# Patient Record
Sex: FEMALE | Race: WHITE | NOT HISPANIC OR LATINO | Employment: UNEMPLOYED | URBAN - METROPOLITAN AREA
[De-identification: names, ages, dates, MRNs, and addresses within clinical notes are randomized per-mention and may not be internally consistent; named-entity substitution may affect disease eponyms.]

---

## 2021-05-10 ENCOUNTER — GYNECOLOGY VISIT (OUTPATIENT)
Dept: OBGYN | Facility: CLINIC | Age: 36
End: 2021-05-10
Payer: COMMERCIAL

## 2021-05-10 VITALS — DIASTOLIC BLOOD PRESSURE: 66 MMHG | WEIGHT: 149 LBS | SYSTOLIC BLOOD PRESSURE: 118 MMHG

## 2021-05-10 DIAGNOSIS — N93.8 DUB (DYSFUNCTIONAL UTERINE BLEEDING): ICD-10-CM

## 2021-05-10 DIAGNOSIS — Z32.01 POSITIVE PREGNANCY TEST: ICD-10-CM

## 2021-05-10 LAB — IN CLINIC OB SCAN: NORMAL

## 2021-05-10 PROCEDURE — 76830 TRANSVAGINAL US NON-OB: CPT | Performed by: OBSTETRICS & GYNECOLOGY

## 2021-05-10 PROCEDURE — 99203 OFFICE O/P NEW LOW 30 MIN: CPT | Mod: 25 | Performed by: OBSTETRICS & GYNECOLOGY

## 2021-05-10 NOTE — PROGRESS NOTES
CC: Confirmation of Pregnancy    HPI: Pt is a 34 yo  lmp 21 who presents for evaluation of amenorrhea.  She has had no bleeding since her last menses.  A urine pregnancy test was positive.  She denies vaginal spotting or pain.  She notes nausea and vomiting.      History reviewed. No pertinent past medical history.    Past Surgical History:   Procedure Laterality Date   • LUMPECTOMY           Current Outpatient Medications:   •  Prenatal MV-Min-Fe Fum-FA-DHA (PRENATAL 1 PO), Take  by mouth., Disp: , Rfl:     Patient has no known allergies.    Family History   Problem Relation Age of Onset   • No Known Problems Mother    • No Known Problems Father        Social History     Socioeconomic History   • Marital status:      Spouse name: Not on file   • Number of children: Not on file   • Years of education: Not on file   • Highest education level: Not on file   Occupational History   • Not on file   Tobacco Use   • Smoking status: Never Smoker   • Smokeless tobacco: Never Used   Substance and Sexual Activity   • Alcohol use: Not Currently   • Drug use: Never   • Sexual activity: Yes     Partners: Male   Other Topics Concern   • Not on file   Social History Narrative   • Not on file     Social Determinants of Health     Financial Resource Strain:    • Difficulty of Paying Living Expenses:    Food Insecurity:    • Worried About Running Out of Food in the Last Year:    • Ran Out of Food in the Last Year:    Transportation Needs:    • Lack of Transportation (Medical):    • Lack of Transportation (Non-Medical):    Physical Activity:    • Days of Exercise per Week:    • Minutes of Exercise per Session:    Stress:    • Feeling of Stress :    Social Connections:    • Frequency of Communication with Friends and Family:    • Frequency of Social Gatherings with Friends and Family:    • Attends Jainism Services:    • Active Member of Clubs or Organizations:    • Attends Club or Organization Meetings:    • Marital  Status:    Intimate Partner Violence:    • Fear of Current or Ex-Partner:    • Emotionally Abused:    • Physically Abused:    • Sexually Abused:        OB History    Para Term  AB Living   3 1 1 0 1 1   SAB TAB Ectopic Molar Multiple Live Births   0 1 0 0 0 1       ROS: negative for dizziness, SOB, chest pain, palpitations, dysuria, vaginal discharge.    /66   Wt 67.6 kg (149 lb)     GENERAL: Alert, in no apparent distress  PSYCHIATRIC: Appropriate affect, intact insight and judgement.  ABDOMEN: Soft, nontender, nondistended.  No palpable masses. No hepatosplenomegaly.   No rebound or guarding.  No inguinal lymphadenopathy.  BACK: No CVA tenderness  EXTREMITIES: No edema, no calf tenderness.    GENITOURINARY:  Normal external genitalia, no lesions.  Normal urethral meatus, no masses or tenderness.  Normal bladder without fullness or masses.  Vagina well estrogenized, no vaginal discharge or lesions.  Cervix normal length, nontender.  Uterus normal size, shape, and contour, nontender.  Adnexa nontender, no masses.  Normal anus and perineum.      TRANSVAGINAL ULTRASOUND - performed and interpreted by me    Single gestational sac present.  Yolk sac present.     Dorsey intrauterine pregnancy with CRL measuring 3.16 cm, c/w 10+0/7 weeks EGA, positive fetal cardiac activity noted. FHTs 174 bpm.  EDC 21.     No fluid in cul de sac.    Ovaries and cervix appear grossly normal. Cervical length = 3.87 cm.      ASSESSMENT/PLAN:  1. DUB visit - Dorsey IUP at 10+0/7 wks. SHERITA will be 2021 by ultrasound today.  Prenatal Vitamins.  F/U for NOB appointment 2 weeks.   2.  Desires NIPT testing -order placed.

## 2021-05-25 ENCOUNTER — HOSPITAL ENCOUNTER (OUTPATIENT)
Facility: MEDICAL CENTER | Age: 36
End: 2021-05-25
Attending: OBSTETRICS & GYNECOLOGY
Payer: COMMERCIAL

## 2021-05-25 ENCOUNTER — INITIAL PRENATAL (OUTPATIENT)
Dept: OBGYN | Facility: CLINIC | Age: 36
End: 2021-05-25
Payer: COMMERCIAL

## 2021-05-25 VITALS — SYSTOLIC BLOOD PRESSURE: 109 MMHG | DIASTOLIC BLOOD PRESSURE: 79 MMHG | WEIGHT: 151 LBS

## 2021-05-25 DIAGNOSIS — Z34.81 ENCOUNTER FOR SUPERVISION OF OTHER NORMAL PREGNANCY IN FIRST TRIMESTER: ICD-10-CM

## 2021-05-25 PROCEDURE — 87591 N.GONORRHOEAE DNA AMP PROB: CPT

## 2021-05-25 PROCEDURE — 87624 HPV HI-RISK TYP POOLED RSLT: CPT

## 2021-05-25 PROCEDURE — 59401 PR NEW OB VISIT: CPT | Performed by: OBSTETRICS & GYNECOLOGY

## 2021-05-25 PROCEDURE — 87491 CHLMYD TRACH DNA AMP PROBE: CPT

## 2021-05-25 PROCEDURE — 88175 CYTOPATH C/V AUTO FLUID REDO: CPT

## 2021-05-26 LAB
C TRACH DNA GENITAL QL NAA+PROBE: NEGATIVE
CYTOLOGY REG CYTOL: NORMAL
HPV HR 12 DNA CVX QL NAA+PROBE: NEGATIVE
HPV16 DNA SPEC QL NAA+PROBE: NEGATIVE
HPV18 DNA SPEC QL NAA+PROBE: NEGATIVE
N GONORRHOEA DNA GENITAL QL NAA+PROBE: NEGATIVE
SPECIMEN SOURCE: NORMAL
SPECIMEN SOURCE: NORMAL

## 2021-06-01 ENCOUNTER — HOSPITAL ENCOUNTER (OUTPATIENT)
Dept: LAB | Facility: MEDICAL CENTER | Age: 36
End: 2021-06-01
Attending: OBSTETRICS & GYNECOLOGY
Payer: COMMERCIAL

## 2021-06-01 DIAGNOSIS — Z34.81 ENCOUNTER FOR SUPERVISION OF OTHER NORMAL PREGNANCY IN FIRST TRIMESTER: ICD-10-CM

## 2021-06-01 LAB
ABO GROUP BLD: NORMAL
AMPHET UR QL SCN: NEGATIVE
BACTERIA #/AREA URNS HPF: ABNORMAL /HPF
BARBITURATES UR QL SCN: NEGATIVE
BENZODIAZ UR QL SCN: NEGATIVE
BLD GP AB SCN SERPL QL: NORMAL
BZE UR QL SCN: NEGATIVE
CANNABINOIDS UR QL SCN: NEGATIVE
EPI CELLS #/AREA URNS HPF: ABNORMAL /HPF
METHADONE UR QL SCN: NEGATIVE
MUCOUS THREADS #/AREA URNS HPF: ABNORMAL /HPF
OPIATES UR QL SCN: NEGATIVE
OXYCODONE UR QL SCN: NEGATIVE
PCP UR QL SCN: NEGATIVE
PROPOXYPH UR QL SCN: NEGATIVE
RBC # URNS HPF: ABNORMAL /HPF
RH BLD: NORMAL
UNIDENT CRYS URNS QL MICRO: ABNORMAL /HPF
WBC #/AREA URNS HPF: ABNORMAL /HPF

## 2021-06-01 PROCEDURE — 86901 BLOOD TYPING SEROLOGIC RH(D): CPT

## 2021-06-01 PROCEDURE — 86900 BLOOD TYPING SEROLOGIC ABO: CPT

## 2021-06-01 PROCEDURE — 87340 HEPATITIS B SURFACE AG IA: CPT

## 2021-06-01 PROCEDURE — 87086 URINE CULTURE/COLONY COUNT: CPT

## 2021-06-01 PROCEDURE — 85025 COMPLETE CBC W/AUTO DIFF WBC: CPT

## 2021-06-01 PROCEDURE — 86762 RUBELLA ANTIBODY: CPT

## 2021-06-01 PROCEDURE — 86850 RBC ANTIBODY SCREEN: CPT

## 2021-06-01 PROCEDURE — 36415 COLL VENOUS BLD VENIPUNCTURE: CPT

## 2021-06-01 PROCEDURE — 80307 DRUG TEST PRSMV CHEM ANLYZR: CPT

## 2021-06-01 PROCEDURE — 81001 URINALYSIS AUTO W/SCOPE: CPT

## 2021-06-01 PROCEDURE — 87389 HIV-1 AG W/HIV-1&-2 AB AG IA: CPT

## 2021-06-01 PROCEDURE — 86780 TREPONEMA PALLIDUM: CPT

## 2021-06-02 ENCOUNTER — HOSPITAL ENCOUNTER (OUTPATIENT)
Dept: LAB | Facility: MEDICAL CENTER | Age: 36
End: 2021-06-02
Attending: OBSTETRICS & GYNECOLOGY
Payer: COMMERCIAL

## 2021-06-02 LAB
APPEARANCE UR: ABNORMAL
BASOPHILS # BLD AUTO: 0.4 % (ref 0–1.8)
BASOPHILS # BLD: 0.03 K/UL (ref 0–0.12)
BILIRUB UR QL STRIP.AUTO: NEGATIVE
COLOR UR: YELLOW
EOSINOPHIL # BLD AUTO: 0.11 K/UL (ref 0–0.51)
EOSINOPHIL NFR BLD: 1.5 % (ref 0–6.9)
ERYTHROCYTE [DISTWIDTH] IN BLOOD BY AUTOMATED COUNT: 38.1 FL (ref 35.9–50)
GLUCOSE UR STRIP.AUTO-MCNC: NEGATIVE MG/DL
HBV SURFACE AG SER QL: ABNORMAL
HCT VFR BLD AUTO: 37.6 % (ref 37–47)
HGB BLD-MCNC: 12.7 G/DL (ref 12–16)
HIV 1+2 AB+HIV1 P24 AG SERPL QL IA: NORMAL
IMM GRANULOCYTES # BLD AUTO: 0.02 K/UL (ref 0–0.11)
IMM GRANULOCYTES NFR BLD AUTO: 0.3 % (ref 0–0.9)
KETONES UR STRIP.AUTO-MCNC: NEGATIVE MG/DL
LEUKOCYTE ESTERASE UR QL STRIP.AUTO: ABNORMAL
LYMPHOCYTES # BLD AUTO: 2.44 K/UL (ref 1–4.8)
LYMPHOCYTES NFR BLD: 33.5 % (ref 22–41)
MCH RBC QN AUTO: 30 PG (ref 27–33)
MCHC RBC AUTO-ENTMCNC: 33.8 G/DL (ref 33.6–35)
MCV RBC AUTO: 88.9 FL (ref 81.4–97.8)
MICRO URNS: ABNORMAL
MONOCYTES # BLD AUTO: 0.54 K/UL (ref 0–0.85)
MONOCYTES NFR BLD AUTO: 7.4 % (ref 0–13.4)
NEUTROPHILS # BLD AUTO: 4.15 K/UL (ref 2–7.15)
NEUTROPHILS NFR BLD: 56.9 % (ref 44–72)
NITRITE UR QL STRIP.AUTO: NEGATIVE
NRBC # BLD AUTO: 0 K/UL
NRBC BLD-RTO: 0 /100 WBC
PH UR STRIP.AUTO: 5.5 [PH] (ref 5–8)
PLATELET # BLD AUTO: 327 K/UL (ref 164–446)
PMV BLD AUTO: 9.4 FL (ref 9–12.9)
PROT UR QL STRIP: NEGATIVE MG/DL
RBC # BLD AUTO: 4.23 M/UL (ref 4.2–5.4)
RBC UR QL AUTO: NEGATIVE
RUBV AB SER QL: 176 IU/ML
SP GR UR STRIP.AUTO: >=1.03
TREPONEMA PALLIDUM IGG+IGM AB [PRESENCE] IN SERUM OR PLASMA BY IMMUNOASSAY: ABNORMAL
WBC # BLD AUTO: 7.3 K/UL (ref 4.8–10.8)

## 2021-06-02 PROCEDURE — 36415 COLL VENOUS BLD VENIPUNCTURE: CPT

## 2021-06-02 PROCEDURE — 99001 SPECIMEN HANDLING PT-LAB: CPT

## 2021-06-03 ENCOUNTER — TELEPHONE (OUTPATIENT)
Dept: OBGYN | Facility: CLINIC | Age: 36
End: 2021-06-03

## 2021-06-04 ENCOUNTER — TELEPHONE (OUTPATIENT)
Dept: OBGYN | Facility: CLINIC | Age: 36
End: 2021-06-04

## 2021-06-04 LAB
BACTERIA UR CULT: NORMAL
SIGNIFICANT IND 70042: NORMAL
SITE SITE: NORMAL
SOURCE SOURCE: NORMAL

## 2021-06-04 NOTE — TELEPHONE ENCOUNTER
Patient called wanting to know what her labs result meant. Let patient know results have not been resulted by provider. Patient wanted to know if she has a UTI. Consulted with Dr. Fontenot no UTI was seen. Let patient know, states no concerns or further questions.

## 2021-06-21 ENCOUNTER — TELEPHONE (OUTPATIENT)
Dept: OBGYN | Facility: CLINIC | Age: 36
End: 2021-06-21

## 2021-06-21 ENCOUNTER — HOSPITAL ENCOUNTER (OUTPATIENT)
Dept: LAB | Facility: MEDICAL CENTER | Age: 36
End: 2021-06-21
Attending: OBSTETRICS & GYNECOLOGY
Payer: COMMERCIAL

## 2021-06-21 ENCOUNTER — ROUTINE PRENATAL (OUTPATIENT)
Dept: OBGYN | Facility: CLINIC | Age: 36
End: 2021-06-21
Payer: COMMERCIAL

## 2021-06-21 VITALS — SYSTOLIC BLOOD PRESSURE: 101 MMHG | WEIGHT: 153.9 LBS | DIASTOLIC BLOOD PRESSURE: 57 MMHG

## 2021-06-21 DIAGNOSIS — Z34.81 ENCOUNTER FOR SUPERVISION OF OTHER NORMAL PREGNANCY IN FIRST TRIMESTER: ICD-10-CM

## 2021-06-21 DIAGNOSIS — Z34.80 SUPERVISION OF OTHER NORMAL PREGNANCY, ANTEPARTUM: ICD-10-CM

## 2021-06-21 PROCEDURE — 90040 PR PRENATAL FOLLOW UP: CPT | Performed by: OBSTETRICS & GYNECOLOGY

## 2021-06-21 PROCEDURE — 36415 COLL VENOUS BLD VENIPUNCTURE: CPT

## 2021-06-21 PROCEDURE — 82105 ALPHA-FETOPROTEIN SERUM: CPT

## 2021-06-21 NOTE — TELEPHONE ENCOUNTER
06/21/21 @ 0955 pt called wondering what her NIPT Results were and if we have received them yet. I explained to pt that I see that that sample has been received but are still processing. Pt understands and agrees.

## 2021-06-25 LAB
# FETUSES US: NORMAL
AFP MOM SERPL: 0.74
AFP SERPL-MCNC: 25 NG/ML
AGE - REPORTED: 34.2 YR
CURRENT SMOKER: NO
FAMILY MEMBER DISEASES HX: NO
GA METHOD: NORMAL
GA: NORMAL WK
IDDM PATIENT QL: NO
INTEGRATED SCN PATIENT-IMP: NORMAL
SPECIMEN DRAWN SERPL: NORMAL

## 2021-07-08 ENCOUNTER — HOSPITAL ENCOUNTER (OUTPATIENT)
Dept: RADIOLOGY | Facility: MEDICAL CENTER | Age: 36
End: 2021-07-08
Attending: OBSTETRICS & GYNECOLOGY
Payer: COMMERCIAL

## 2021-07-16 ENCOUNTER — TELEPHONE (OUTPATIENT)
Dept: OBGYN | Facility: CLINIC | Age: 36
End: 2021-07-16

## 2021-07-16 DIAGNOSIS — O28.5 CHROMOSOMAL ANALYSIS ABNORMAL, FETAL, AFFECTING MOTHER, ANTEPARTUM: ICD-10-CM

## 2021-07-16 NOTE — TELEPHONE ENCOUNTER
07/16/21 9:49 AM    Pt called in regards to her NIPT results she has not received. Pt was drawn June 2nd and still had not heard. Printed off results from [facility], consulted with Dr. Alcala, referral to [facility] was ordered, Agustina Midwife offered to call pt with results.

## 2021-07-16 NOTE — PROGRESS NOTES
Name and  verified prior to speaking with pt.  Let her know gender and that testing results for Monosomy X were abnormal and referral to Dr Aggarwal placed.

## 2021-07-16 NOTE — TELEPHONE ENCOUNTER
07/16/21 11:19 AM    Pt called regarding the results of her NIPT after Agustina Lema call to inform her. Pt had questions regarding her anatomy is not scheduled until after her 20 weeks and she knows she may want options if the baby has chromosomal abnormalities. I let her know that Dr. Aggarwal will offer her more invasive testing to determine if anything is wrong and will do an ultrasound at that time of her appt.  She proceeded to let me know that her insurance changed. I let the pt know that I would transfer her to the  to update that information before our referrals dept goes to auth it. I asked pt if there were any more questions before transferring, pt did not and I transferred to [place] at the .

## 2021-07-16 NOTE — TELEPHONE ENCOUNTER
Patient called inquiring about her recent results and referral to Dr. Aggarwal. Advised pt that she can express her concerns and see what Dr. Aggarwal says about her results.

## 2021-07-20 ENCOUNTER — TELEPHONE (OUTPATIENT)
Dept: OBGYN | Facility: CLINIC | Age: 36
End: 2021-07-20

## 2021-07-20 NOTE — TELEPHONE ENCOUNTER
Pt called to see about getting a referral to [facility] I advised I will send a message to Dr. Murphy. Please allow a little time to receive a response.     Felice

## 2021-07-21 ENCOUNTER — HOSPITAL ENCOUNTER (OUTPATIENT)
Dept: LAB | Facility: MEDICAL CENTER | Age: 36
End: 2021-07-21
Attending: OBSTETRICS & GYNECOLOGY
Payer: COMMERCIAL

## 2021-07-21 ENCOUNTER — HOSPITAL ENCOUNTER (OUTPATIENT)
Facility: MEDICAL CENTER | Age: 36
End: 2021-07-21
Attending: OBSTETRICS & GYNECOLOGY
Payer: COMMERCIAL

## 2021-07-21 LAB — AMBIGUOUS DTTM AMBI4: NORMAL

## 2021-07-21 PROCEDURE — 81265 STR MARKERS SPECIMEN ANAL: CPT

## 2021-07-21 PROCEDURE — 81229 CYTOG ALYS CHRML ABNR SNPCGH: CPT

## 2021-07-21 PROCEDURE — 369999 HCHG MISC LAB CHARGE

## 2021-07-21 PROCEDURE — 82106 ALPHA-FETOPROTEIN AMNIOTIC: CPT

## 2021-07-21 PROCEDURE — 88261 CHROMOSOME ANALYSIS 5: CPT

## 2021-07-21 PROCEDURE — 88230 TISSUE CULTURE LYMPHOCYTE: CPT | Mod: 91

## 2021-07-21 PROCEDURE — 88235 TISSUE CULTURE PLACENTA: CPT

## 2021-07-21 PROCEDURE — 36415 COLL VENOUS BLD VENIPUNCTURE: CPT

## 2021-07-22 ENCOUNTER — ROUTINE PRENATAL (OUTPATIENT)
Dept: OBGYN | Facility: CLINIC | Age: 36
End: 2021-07-22
Payer: COMMERCIAL

## 2021-07-22 VITALS — SYSTOLIC BLOOD PRESSURE: 125 MMHG | WEIGHT: 157 LBS | DIASTOLIC BLOOD PRESSURE: 79 MMHG

## 2021-07-22 DIAGNOSIS — O09.92 SUPERVISION OF HIGH RISK PREGNANCY IN SECOND TRIMESTER: ICD-10-CM

## 2021-07-22 PROCEDURE — 90040 PR PRENATAL FOLLOW UP: CPT | Performed by: OBSTETRICS & GYNECOLOGY

## 2021-07-22 NOTE — PROGRESS NOTES
"Patient states today she is upset regarding her NIPT results, and delay of notification.  NIPT results showed \"no call result on X chromosome\".  She was seen by Dr. Aggarwal from perinatology on 7/21/2021.  He preferred a detailed ultrasound which was normal except for a small foramen ovale aneurysm.  She underwent amniocentesis, which was sent for AFP, karyotype, and microarray.  Dr. Aggarwal told her the results will be back in 3 to 4 weeks.  She is very anxious with regards to results, and is considering termination if the fetus is abnormal.  She is concerned that she will not get all the results back before 24 weeks, and will not be able to make an informed decision regarding termination.    The patient requests a referral to [facility] perinatology OB/GYN department, because she has heard that perhaps they can get results back within a 2-week interval.  I have placed an urgent referral per her request.    The patient will plan to follow-up in 4 weeks.  I have urged her to call with any concerns, or we we will walk her in earlier if necessary.   "

## 2021-07-27 ENCOUNTER — TELEPHONE (OUTPATIENT)
Dept: OBGYN | Facility: CLINIC | Age: 36
End: 2021-07-27

## 2021-07-27 NOTE — TELEPHONE ENCOUNTER
"Lisa from [facility] calling to get NIPT results from pt. Lisa called very upset stating that she had called Dr. Burroughs office and had requested the NIPT results but was told that they cannot provide that information due to it will be HIPAA violation since [facility] was the one who order the testing. I told her that we have the same protocol here and that if she needs to get the NIPT results a Release Records consent will need to be signed by the pt. In order for us to disclose that information. Lisa sounded upset and said \"No that's ok\" and ended the call.   "

## 2021-08-02 LAB
TEST NAME 95000: NORMAL
UNCODED TEST RESULT 95040: NORMAL

## 2021-08-03 ENCOUNTER — APPOINTMENT (OUTPATIENT)
Dept: RADIOLOGY | Facility: MEDICAL CENTER | Age: 36
End: 2021-08-03
Attending: OBSTETRICS & GYNECOLOGY
Payer: COMMERCIAL

## 2021-08-03 LAB — TEST NAME 95000: NORMAL

## 2021-08-04 ENCOUNTER — PATIENT MESSAGE (OUTPATIENT)
Dept: OBGYN | Facility: CLINIC | Age: 36
End: 2021-08-04

## 2021-08-04 NOTE — TELEPHONE ENCOUNTER
I spoke to the Pt and let her know that her results are still pending. Pt understood and had no other concerns. She would like us to check tomorrow on her results due to Heike NAVARRETE out of the office. I told her I would check daily

## 2021-08-07 LAB — UNCODED TEST RESULT 95040: NORMAL

## 2021-08-19 ENCOUNTER — ROUTINE PRENATAL (OUTPATIENT)
Dept: OBGYN | Facility: CLINIC | Age: 36
End: 2021-08-19
Payer: COMMERCIAL

## 2021-08-19 VITALS — DIASTOLIC BLOOD PRESSURE: 65 MMHG | WEIGHT: 165 LBS | SYSTOLIC BLOOD PRESSURE: 122 MMHG

## 2021-08-19 DIAGNOSIS — Z34.82 ENCOUNTER FOR SUPERVISION OF OTHER NORMAL PREGNANCY IN SECOND TRIMESTER: ICD-10-CM

## 2021-08-19 PROCEDURE — 90040 PR PRENATAL FOLLOW UP: CPT | Performed by: OBSTETRICS & GYNECOLOGY

## 2021-09-09 ENCOUNTER — HOSPITAL ENCOUNTER (OUTPATIENT)
Dept: LAB | Facility: MEDICAL CENTER | Age: 36
End: 2021-09-09
Attending: OBSTETRICS & GYNECOLOGY
Payer: COMMERCIAL

## 2021-09-09 DIAGNOSIS — Z34.82 ENCOUNTER FOR SUPERVISION OF OTHER NORMAL PREGNANCY IN SECOND TRIMESTER: ICD-10-CM

## 2021-09-09 LAB
BASOPHILS # BLD AUTO: 0.5 % (ref 0–1.8)
BASOPHILS # BLD: 0.04 K/UL (ref 0–0.12)
EOSINOPHIL # BLD AUTO: 0.08 K/UL (ref 0–0.51)
EOSINOPHIL NFR BLD: 1 % (ref 0–6.9)
ERYTHROCYTE [DISTWIDTH] IN BLOOD BY AUTOMATED COUNT: 41.1 FL (ref 35.9–50)
GLUCOSE 1H P 50 G GLC PO SERPL-MCNC: 85 MG/DL (ref 70–139)
HCT VFR BLD AUTO: 35 % (ref 37–47)
HGB BLD-MCNC: 11.4 G/DL (ref 12–16)
IMM GRANULOCYTES # BLD AUTO: 0.09 K/UL (ref 0–0.11)
IMM GRANULOCYTES NFR BLD AUTO: 1.2 % (ref 0–0.9)
LYMPHOCYTES # BLD AUTO: 1.85 K/UL (ref 1–4.8)
LYMPHOCYTES NFR BLD: 24.2 % (ref 22–41)
MCH RBC QN AUTO: 29.6 PG (ref 27–33)
MCHC RBC AUTO-ENTMCNC: 32.6 G/DL (ref 33.6–35)
MCV RBC AUTO: 90.9 FL (ref 81.4–97.8)
MONOCYTES # BLD AUTO: 0.53 K/UL (ref 0–0.85)
MONOCYTES NFR BLD AUTO: 6.9 % (ref 0–13.4)
NEUTROPHILS # BLD AUTO: 5.07 K/UL (ref 2–7.15)
NEUTROPHILS NFR BLD: 66.2 % (ref 44–72)
NRBC # BLD AUTO: 0 K/UL
NRBC BLD-RTO: 0 /100 WBC
PLATELET # BLD AUTO: 290 K/UL (ref 164–446)
PMV BLD AUTO: 10.6 FL (ref 9–12.9)
RBC # BLD AUTO: 3.85 M/UL (ref 4.2–5.4)
TREPONEMA PALLIDUM IGG+IGM AB [PRESENCE] IN SERUM OR PLASMA BY IMMUNOASSAY: NORMAL
WBC # BLD AUTO: 7.7 K/UL (ref 4.8–10.8)

## 2021-09-09 PROCEDURE — 86780 TREPONEMA PALLIDUM: CPT

## 2021-09-09 PROCEDURE — 36415 COLL VENOUS BLD VENIPUNCTURE: CPT

## 2021-09-09 PROCEDURE — 82950 GLUCOSE TEST: CPT

## 2021-09-09 PROCEDURE — 85025 COMPLETE CBC W/AUTO DIFF WBC: CPT

## 2021-09-14 ENCOUNTER — OFFICE VISIT (OUTPATIENT)
Dept: MEDICAL GROUP | Facility: PHYSICIAN GROUP | Age: 36
End: 2021-09-14
Payer: COMMERCIAL

## 2021-09-14 VITALS
DIASTOLIC BLOOD PRESSURE: 68 MMHG | WEIGHT: 167 LBS | TEMPERATURE: 97.8 F | SYSTOLIC BLOOD PRESSURE: 122 MMHG | OXYGEN SATURATION: 98 % | HEART RATE: 88 BPM | BODY MASS INDEX: 26.84 KG/M2 | HEIGHT: 66 IN | RESPIRATION RATE: 20 BRPM

## 2021-09-14 DIAGNOSIS — D22.9 NEVUS: ICD-10-CM

## 2021-09-14 PROBLEM — O28.5: Status: RESOLVED | Noted: 2021-07-16 | Resolved: 2021-09-14

## 2021-09-14 PROCEDURE — 99213 OFFICE O/P EST LOW 20 MIN: CPT | Performed by: PHYSICIAN ASSISTANT

## 2021-09-14 ASSESSMENT — PATIENT HEALTH QUESTIONNAIRE - PHQ9: CLINICAL INTERPRETATION OF PHQ2 SCORE: 0

## 2021-09-14 NOTE — ASSESSMENT & PLAN NOTE
Patient states that she has a nevus that has been there for her entire life located under bra line on left.  that she would like evaluated. She is currently 28 weeks pregnant and states the hormones of pregnancy are changing the appearance of the mole.

## 2021-09-14 NOTE — PROGRESS NOTES
"Subjective:     CC: establish care, nevus    HPI:   Zuly presents today with     Nevus  Patient states that she has a nevus that has been there for her entire life located under bra line on left.  that she would like evaluated. She is currently 28 weeks pregnant and states the hormones of pregnancy are changing the appearance of the mole.       No past medical history on file.    Social History     Tobacco Use   • Smoking status: Never Smoker   • Smokeless tobacco: Never Used   Vaping Use   • Vaping Use: Never used   Substance Use Topics   • Alcohol use: Not Currently   • Drug use: Never       Current Outpatient Medications Ordered in Epic   Medication Sig Dispense Refill   • Prenatal Vit-Fe Fumarate-FA (PRENATAL ONE DAILY PO) Take  by mouth.       No current Epic-ordered facility-administered medications on file.       Allergies:  Patient has no known allergies.    Health Maintenance: Completed    ROS:  Gen: no fevers/chills  Eyes: no changes in vision  ENT: no sore throat  Pulm: no sob, no cough  CV: no chest pain  GI: no nausea/vomiting  : no dysuria  MSk: no myalgias  Skin: positive for nevus  Neuro: no headaches  Psych: no depression, no anxiety    Objective:     Exam:  /68 (BP Location: Left arm, Patient Position: Sitting, BP Cuff Size: Adult)   Pulse 88   Temp 36.6 °C (97.8 °F) (Temporal)   Resp 20   Ht 1.676 m (5' 6\")   Wt 75.8 kg (167 lb)   SpO2 98%   Breastfeeding No   BMI 26.95 kg/m²  Body mass index is 26.95 kg/m².    Gen: Alert and oriented, No apparent distress.  Skin: Warm, dry, good turgor, no rashes in visible areas. 1 cm raised light brown nevus under left breast  HEENT: Normocephalic. Eyes conjunctiva clear lids without ptosis, pupils equal and reactive to light accommodation, ears normal shape and contour  Neck: Trachea midline, no masses, no thyromegaly  Lungs: Normal effort, CTA bilaterally, no wheezes, rhonchi, or rales  CV: Regular rate and rhythm. No murmurs, rubs, or " gallops.  MSK: Normal gait, moves all extremities.  Neuro: Grossly non-focal.  Ext: No clubbing, cyanosis, edema.  Psych: Alert and oriented x3, normal affect and mood.     Assessment & Plan:     35 y.o. female with the following -     1. Nevus  Patient has a nevus under left bra line that has been present her whole life. Her  is concerned because he thinks it has changed in appearance since she has been pregnant. Suspect the change in appearance is coming from irritation from her bra rubbing the area, however I have put in a referral to dermatology for possible removal as the patient is interested in this.   - REFERRAL TO DERMATOLOGY    I spent a total of 21 minutes with record review (including external notes and labs), exam, communication with the patient, communication with other providers, and documentation of this encounter.     Return if symptoms worsen or fail to improve, for annual wellness or sooner if needed.    Please note that this dictation was created using voice recognition software. I have made every reasonable attempt to correct obvious errors, but I expect that there are errors of grammar and possibly content that I did not discover before finalizing the note.    Electronically signed by Claudine Whatley PA-C on September 14, 2021

## 2021-09-15 ENCOUNTER — ROUTINE PRENATAL (OUTPATIENT)
Dept: OBGYN | Facility: CLINIC | Age: 36
End: 2021-09-15
Payer: COMMERCIAL

## 2021-09-15 VITALS — BODY MASS INDEX: 26.47 KG/M2 | SYSTOLIC BLOOD PRESSURE: 99 MMHG | DIASTOLIC BLOOD PRESSURE: 68 MMHG | WEIGHT: 164 LBS

## 2021-09-15 DIAGNOSIS — O09.93 HIGH-RISK PREGNANCY IN THIRD TRIMESTER: ICD-10-CM

## 2021-09-15 PROCEDURE — 90040 PR PRENATAL FOLLOW UP: CPT | Performed by: OBSTETRICS & GYNECOLOGY

## 2021-09-15 PROCEDURE — 90715 TDAP VACCINE 7 YRS/> IM: CPT | Performed by: OBSTETRICS & GYNECOLOGY

## 2021-09-15 PROCEDURE — 90471 IMMUNIZATION ADMIN: CPT | Performed by: OBSTETRICS & GYNECOLOGY

## 2021-09-15 NOTE — PROGRESS NOTES
OB Followup;    28w2d    Patient Active Problem List    Diagnosis Date Noted   • Nevus 2021       Vitals:    09/15/21 0824   BP: (!) 99/68   Weight: 74.4 kg (164 lb)       Patient presents for followup of OB care. Currently doing well . Good fetal movement no leakage of fluid no contractions or vaginal bleeding    Genetic amniocentesis (Dr. Aggarwal) normal per patient history    Size equals dates, normal fetal heart rate      Labs; CBC GTT and RPR normal    Tdap today    Discussed Covid vaccination-patient will receive Covid within the next week  Her  will also get vaccination    Labor precautions given  Discussed proper weight gain during pregnancy.    Signs and symptoms of labor/ labor discussed   Discussed our group's policy on prenatal checkups and delivery  Discussed Covid precautions  Discussed Covid vaccination recommendations from CDC/ACOG  Discussed proper exercise during pregnancy  Discussed proper oral fluid hydration  Currently taking prenatal vitamins as instructed  Reviewed fetal kick counts and appropriate fetal movement during pregnancy  Reviewed postpartum birth control methods  All questions answered in detail    Followup in  2 weeks

## 2021-09-15 NOTE — LETTER
"Count Your Baby's Movements  Another step to a healthy delivery    A Epic Dress Re Test             Dept: [phone]    How Many Weeks Pregnant? 28w2d    Date to Begin Countin/15/2021              How to use this chart    One way for your physician to keep track of your baby's health is by knowing how often the baby moves (or \"kicks\") in your womb.  You can help your physician to do this by using this chart every day.    Every day, you should see how many hours it takes for your baby to move 10 times.  Start in the morning, as soon as you get up.    · First, write down the time your baby moves until you get to 10.  · Check off one box every time your baby moves until you get to 10.  · Write down the time you finished counting in the last column.  · Total how long it took to count up all 10 movements.  · Finally, fill in the box that shows how long this took.  After counting 10 movements, you no longer have to count any more that day.  The next morning, just start counting again as soon as you get up.    What should you call a \"movement\"?  It is hard to say, because it will feel different from one mother to another and from one pregnancy to the next.  The important thing is that you count the movements the same way throughout your pregnancy.  If you have more questions, you should ask your physician.    Count carefully every day!  SAMPLE:  Week 28    How many hours did it take to feel 10 movements?       Start  Time     1     2     3     4     5     6     7     8     9     10   Finish Time   Mon 8:20 ·  ·  ·  ·  ·  ·  ·  ·  ·  ·  11:40                  Sat               Sun                 IMPORTANT: You should contact your physician if it takes more than two hours for you to feel 10 movements.  Each morning, write down the time and start to count the movements of your baby.  Keep track by checking off one box every time you feel one movement.  When you " "have felt 10 \"kicks\", write down the time you finished counting in the last column.  Then fill in the   box (over the check nancy) for the number of hours it took.  Be sure to read the complete instructions on the previous page.            "

## 2021-09-30 ENCOUNTER — ROUTINE PRENATAL (OUTPATIENT)
Dept: OBGYN | Facility: CLINIC | Age: 36
End: 2021-09-30
Payer: COMMERCIAL

## 2021-09-30 VITALS — SYSTOLIC BLOOD PRESSURE: 105 MMHG | WEIGHT: 167 LBS | BODY MASS INDEX: 26.95 KG/M2 | DIASTOLIC BLOOD PRESSURE: 64 MMHG

## 2021-09-30 DIAGNOSIS — Z34.83 ENCOUNTER FOR SUPERVISION OF OTHER NORMAL PREGNANCY IN THIRD TRIMESTER: ICD-10-CM

## 2021-09-30 PROCEDURE — 90040 PR PRENATAL FOLLOW UP: CPT | Performed by: OBSTETRICS & GYNECOLOGY

## 2021-10-15 ENCOUNTER — ROUTINE PRENATAL (OUTPATIENT)
Dept: OBGYN | Facility: CLINIC | Age: 36
End: 2021-10-15
Payer: COMMERCIAL

## 2021-10-15 VITALS — WEIGHT: 172 LBS | SYSTOLIC BLOOD PRESSURE: 113 MMHG | BODY MASS INDEX: 27.76 KG/M2 | DIASTOLIC BLOOD PRESSURE: 66 MMHG

## 2021-10-15 DIAGNOSIS — Z34.83 PRENATAL CARE, SUBSEQUENT PREGNANCY IN THIRD TRIMESTER: Primary | ICD-10-CM

## 2021-10-15 PROCEDURE — 90040 PR PRENATAL FOLLOW UP: CPT | Performed by: OBSTETRICS & GYNECOLOGY

## 2021-10-28 ENCOUNTER — ROUTINE PRENATAL (OUTPATIENT)
Dept: OBGYN | Facility: CLINIC | Age: 36
End: 2021-10-28
Payer: COMMERCIAL

## 2021-10-28 VITALS — DIASTOLIC BLOOD PRESSURE: 82 MMHG | BODY MASS INDEX: 28.08 KG/M2 | WEIGHT: 174 LBS | SYSTOLIC BLOOD PRESSURE: 114 MMHG

## 2021-10-28 DIAGNOSIS — Z34.83 ENCOUNTER FOR SUPERVISION OF OTHER NORMAL PREGNANCY IN THIRD TRIMESTER: ICD-10-CM

## 2021-10-28 PROCEDURE — 90040 PR PRENATAL FOLLOW UP: CPT | Performed by: OBSTETRICS & GYNECOLOGY

## 2021-11-09 ENCOUNTER — APPOINTMENT (OUTPATIENT)
Dept: OBGYN | Facility: CLINIC | Age: 36
End: 2021-11-09
Payer: COMMERCIAL

## 2021-11-10 ENCOUNTER — ROUTINE PRENATAL (OUTPATIENT)
Dept: OBGYN | Facility: CLINIC | Age: 36
End: 2021-11-10
Payer: COMMERCIAL

## 2021-11-10 ENCOUNTER — HOSPITAL ENCOUNTER (OUTPATIENT)
Facility: MEDICAL CENTER | Age: 36
End: 2021-11-10
Attending: OBSTETRICS & GYNECOLOGY
Payer: COMMERCIAL

## 2021-11-10 VITALS — BODY MASS INDEX: 28.08 KG/M2 | WEIGHT: 174 LBS | DIASTOLIC BLOOD PRESSURE: 68 MMHG | SYSTOLIC BLOOD PRESSURE: 108 MMHG

## 2021-11-10 DIAGNOSIS — Z34.83 ENCOUNTER FOR SUPERVISION OF OTHER NORMAL PREGNANCY IN THIRD TRIMESTER: ICD-10-CM

## 2021-11-10 PROCEDURE — 90040 PR PRENATAL FOLLOW UP: CPT | Performed by: OBSTETRICS & GYNECOLOGY

## 2021-11-10 PROCEDURE — 87150 DNA/RNA AMPLIFIED PROBE: CPT

## 2021-11-10 PROCEDURE — 87081 CULTURE SCREEN ONLY: CPT

## 2021-11-10 RX ORDER — BREAST PUMP
EACH MISCELLANEOUS
Qty: 1 EACH | Refills: 0 | Status: SHIPPED | OUTPATIENT
Start: 2021-11-10 | End: 2021-11-25

## 2021-11-12 LAB — GP B STREP DNA SPEC QL NAA+PROBE: POSITIVE

## 2021-11-13 ENCOUNTER — HOSPITAL ENCOUNTER (OUTPATIENT)
Dept: RADIOLOGY | Facility: MEDICAL CENTER | Age: 36
End: 2021-11-13
Attending: OBSTETRICS & GYNECOLOGY
Payer: COMMERCIAL

## 2021-11-13 PROCEDURE — 76816 OB US FOLLOW-UP PER FETUS: CPT

## 2021-11-15 PROBLEM — O99.820 GROUP B STREPTOCOCCAL CARRIAGE COMPLICATING PREGNANCY: Status: ACTIVE | Noted: 2021-11-15

## 2021-11-19 ENCOUNTER — ROUTINE PRENATAL (OUTPATIENT)
Dept: OBGYN | Facility: CLINIC | Age: 36
End: 2021-11-19
Payer: COMMERCIAL

## 2021-11-19 VITALS — BODY MASS INDEX: 28.25 KG/M2 | WEIGHT: 175 LBS | DIASTOLIC BLOOD PRESSURE: 73 MMHG | SYSTOLIC BLOOD PRESSURE: 122 MMHG

## 2021-11-19 DIAGNOSIS — Z34.83 ENCOUNTER FOR SUPERVISION OF OTHER NORMAL PREGNANCY IN THIRD TRIMESTER: ICD-10-CM

## 2021-11-19 PROCEDURE — 90040 PR PRENATAL FOLLOW UP: CPT | Performed by: OBSTETRICS & GYNECOLOGY

## 2021-11-24 ENCOUNTER — ROUTINE PRENATAL (OUTPATIENT)
Dept: OBGYN | Facility: CLINIC | Age: 36
End: 2021-11-24
Payer: COMMERCIAL

## 2021-11-24 VITALS — DIASTOLIC BLOOD PRESSURE: 69 MMHG | SYSTOLIC BLOOD PRESSURE: 103 MMHG

## 2021-11-24 DIAGNOSIS — Z34.83 PRENATAL CARE, SUBSEQUENT PREGNANCY IN THIRD TRIMESTER: Primary | ICD-10-CM

## 2021-11-24 PROCEDURE — 90040 PR PRENATAL FOLLOW UP: CPT | Performed by: OBSTETRICS & GYNECOLOGY

## 2021-11-24 NOTE — PROGRESS NOTES
US 11/13 for S<D  EFW 13% with short long bones (femur <2%, humerus 5%). Previously had amnio with normal chromosomes. 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     IOL already planned for 11/29.  Cervix now more favorable. Pt may still come in for PM induction time but advised she may just stay inpatient and start pitocin. Plan TBD by on call team.

## 2021-11-25 ENCOUNTER — OFFICE VISIT (OUTPATIENT)
Dept: URGENT CARE | Facility: PHYSICIAN GROUP | Age: 36
End: 2021-11-25
Payer: COMMERCIAL

## 2021-11-25 VITALS
OXYGEN SATURATION: 97 % | RESPIRATION RATE: 16 BRPM | WEIGHT: 175 LBS | SYSTOLIC BLOOD PRESSURE: 98 MMHG | DIASTOLIC BLOOD PRESSURE: 74 MMHG | TEMPERATURE: 98.4 F | HEART RATE: 74 BPM | HEIGHT: 66 IN | BODY MASS INDEX: 28.12 KG/M2

## 2021-11-25 DIAGNOSIS — S61.412A LACERATION OF LEFT HAND WITHOUT FOREIGN BODY, INITIAL ENCOUNTER: ICD-10-CM

## 2021-11-25 PROCEDURE — 12001 RPR S/N/AX/GEN/TRNK 2.5CM/<: CPT | Performed by: FAMILY MEDICINE

## 2021-11-25 ASSESSMENT — ENCOUNTER SYMPTOMS
SENSORY CHANGE: 0
VOMITING: 0
TINGLING: 0
FOCAL WEAKNESS: 0
FEVER: 0

## 2021-11-25 NOTE — PROGRESS NOTES
Subjective:     Zuly Wade is a 36 y.o. female who presents for Dog Bite (Dog bite between web on 4rth and 5th digit. Onset 1 hour. )    HPI  Pt presents for evaluation of an acute problem  Patient with a dog bite sustained about an hour prior to arrival  This was patient's own dog  Laceration is between the fourth and fifth digit on her left hand  Immediately irrigated the area with hydrogen peroxide after the bite  Retains full strength and range of motion of fingers  No numbness or tingling  Patient is 38 weeks pregnant    Review of Systems   Constitutional: Negative for fever.   Gastrointestinal: Negative for vomiting.   Skin: Negative for rash.   Neurological: Negative for tingling, sensory change and focal weakness.     PMH:  has no past medical history of Addisons disease ([facility]), Adrenal disorder (HCC), Allergy, Anemia, Anxiety, Arrhythmia, Arthritis, Asthma, Blood transfusion without reported diagnosis, Cancer ([facility]), Cataract, CHF (congestive heart failure) ([facility]), Clotting disorder ([facility]), COPD (chronic obstructive pulmonary disease) ([facility]), Cushings syndrome ([facility]), Depression, Diabetes ([facility]), Diabetic neuropathy ([facility]), GERD (gastroesophageal reflux disease), Glaucoma, Goiter, Head ache, Heart attack ([facility]), Heart murmur, HIV (human immunodeficiency virus infection) ([facility]), Hyperlipidemia, Hypertension, IBD (inflammatory bowel disease), Kidney disease, Meningitis, Migraine, Muscle disorder, Osteoporosis, Parathyroid disorder ([facility]), Pituitary disease ([facility]), Pulmonary emphysema ([facility]), Seizure ([facility]), Sickle cell disease ([facility]), Stroke ([facility]), Substance abuse ([facility]), Thyroid disease, Tuberculosis, or Urinary tract infection.  MEDS:   Current Outpatient Medications:   •  Prenatal Vit-Fe Fumarate-FA (PRENATAL ONE DAILY PO), Take  by mouth., Disp: , Rfl:   •  Misc. Devices (BREAST PUMP) Misc, Use breast pump and accessories as needed. Please provide pump and accessories per insurance coverage (Patient not taking:  "Reported on 11/25/2021), Disp: 1 Each, Rfl: 0  ALLERGIES: No Known Allergies  SURGHX:   Past Surgical History:   Procedure Laterality Date   • LUMPECTOMY       SOCHX:  reports that she has never smoked. She has never used smokeless tobacco. She reports previous alcohol use. She reports that she does not use drugs.     Objective:   BP (!) 98/74 (BP Location: Left arm, Patient Position: Sitting, BP Cuff Size: Adult)   Pulse 74   Temp 36.9 °C (98.4 °F) (Temporal)   Resp 16   Ht 1.676 m (5' 6\")   Wt 79.4 kg (175 lb)   SpO2 97%   BMI 28.25 kg/m²     Physical Exam  Constitutional:       General: She is not in acute distress.     Appearance: She is well-developed. She is not diaphoretic.   Pulmonary:      Effort: Pulmonary effort is normal.   Neurological:      Mental Status: She is alert.     Left hand  Appearance: 1.5 cm laceration which is full-thickness and located in the fourth/fifth interdigit webspace.  Some subcutaneous fat visible without injury to deeper structures, no visible tendon, no visible cartilage/bone  ROM: FROM throughout   Strength: 5/5 throughout   Neuro: median, radial, ulnar nerves intact on testing  Vascular: radial, ulnar pulses 2+ and symmetric, cap refill <2 sec    Assessment/Plan:   Assessment    1. Laceration of left hand without foreign body, initial encounter    Laceration repair  Area thoroughly irrigated with Hibiclens and then prepped with Betadine.  1% lidocaine without epinephrine used to anesthetize the area.  Used for interrupted sutures of 5-0 Prolene to repair the area.  Patient tolerated procedure well and had no acute complications.    Laceration repaired on the hand.  Did have a long discussion of pros and cons of prophylactic antibiotics and prefer to avoid Augmentin due to risk to baby.  Laceration has just happened and was able to be cleaned immediately.  Risk of infection quite low.  This is on an area which has tension frequently during finger movement and did " recommend closure to ensure healing and for cosmetic reasons.  Extensively reviewed risk of infection and signs of infection to watch for.  We will follow-up in 7 to 10 days for suture removal.

## 2021-11-26 ENCOUNTER — HOSPITAL ENCOUNTER (OUTPATIENT)
Dept: OBGYN | Facility: MEDICAL CENTER | Age: 36
End: 2021-11-26
Payer: COMMERCIAL

## 2021-11-26 PROCEDURE — U0003 INFECTIOUS AGENT DETECTION BY NUCLEIC ACID (DNA OR RNA); SEVERE ACUTE RESPIRATORY SYNDROME CORONAVIRUS 2 (SARS-COV-2) (CORONAVIRUS DISEASE [COVID-19]), AMPLIFIED PROBE TECHNIQUE, MAKING USE OF HIGH THROUGHPUT TECHNOLOGIES AS DESCRIBED BY CMS-2020-01-R: HCPCS

## 2021-11-26 PROCEDURE — U0005 INFEC AGEN DETEC AMPLI PROBE: HCPCS

## 2021-11-27 LAB
SARS-COV-2 RNA RESP QL NAA+PROBE: NOTDETECTED
SPECIMEN SOURCE: NORMAL

## 2021-11-30 ENCOUNTER — ANESTHESIA (OUTPATIENT)
Dept: ANESTHESIOLOGY | Facility: MEDICAL CENTER | Age: 36
End: 2021-11-30
Payer: COMMERCIAL

## 2021-11-30 ENCOUNTER — HOSPITAL ENCOUNTER (INPATIENT)
Facility: MEDICAL CENTER | Age: 36
LOS: 2 days | End: 2021-12-02
Attending: OBSTETRICS & GYNECOLOGY | Admitting: OBSTETRICS & GYNECOLOGY
Payer: COMMERCIAL

## 2021-11-30 ENCOUNTER — ANESTHESIA EVENT (OUTPATIENT)
Dept: ANESTHESIOLOGY | Facility: MEDICAL CENTER | Age: 36
End: 2021-11-30
Payer: COMMERCIAL

## 2021-11-30 ENCOUNTER — APPOINTMENT (OUTPATIENT)
Dept: OBGYN | Facility: MEDICAL CENTER | Age: 36
End: 2021-11-30
Attending: OBSTETRICS & GYNECOLOGY
Payer: COMMERCIAL

## 2021-11-30 DIAGNOSIS — O92.29 SORE NIPPLES DUE TO LACTATION: ICD-10-CM

## 2021-11-30 LAB
BASOPHILS # BLD AUTO: 0.4 % (ref 0–1.8)
BASOPHILS # BLD: 0.03 K/UL (ref 0–0.12)
EOSINOPHIL # BLD AUTO: 0.05 K/UL (ref 0–0.51)
EOSINOPHIL NFR BLD: 0.6 % (ref 0–6.9)
ERYTHROCYTE [DISTWIDTH] IN BLOOD BY AUTOMATED COUNT: 43 FL (ref 35.9–50)
HCT VFR BLD AUTO: 34.1 % (ref 37–47)
HGB BLD-MCNC: 11.2 G/DL (ref 12–16)
HOLDING TUBE BB 8507: NORMAL
IMM GRANULOCYTES # BLD AUTO: 0.05 K/UL (ref 0–0.11)
IMM GRANULOCYTES NFR BLD AUTO: 0.6 % (ref 0–0.9)
LYMPHOCYTES # BLD AUTO: 2.23 K/UL (ref 1–4.8)
LYMPHOCYTES NFR BLD: 27.4 % (ref 22–41)
MCH RBC QN AUTO: 28.2 PG (ref 27–33)
MCHC RBC AUTO-ENTMCNC: 32.8 G/DL (ref 33.6–35)
MCV RBC AUTO: 85.9 FL (ref 81.4–97.8)
MONOCYTES # BLD AUTO: 0.54 K/UL (ref 0–0.85)
MONOCYTES NFR BLD AUTO: 6.6 % (ref 0–13.4)
NEUTROPHILS # BLD AUTO: 5.23 K/UL (ref 2–7.15)
NEUTROPHILS NFR BLD: 64.4 % (ref 44–72)
NRBC # BLD AUTO: 0 K/UL
NRBC BLD-RTO: 0 /100 WBC
PLATELET # BLD AUTO: 304 K/UL (ref 164–446)
PMV BLD AUTO: 11 FL (ref 9–12.9)
RBC # BLD AUTO: 3.97 M/UL (ref 4.2–5.4)
WBC # BLD AUTO: 8.1 K/UL (ref 4.8–10.8)

## 2021-11-30 PROCEDURE — 700105 HCHG RX REV CODE 258: Performed by: OBSTETRICS & GYNECOLOGY

## 2021-11-30 PROCEDURE — 700111 HCHG RX REV CODE 636 W/ 250 OVERRIDE (IP): Performed by: ANESTHESIOLOGY

## 2021-11-30 PROCEDURE — 85025 COMPLETE CBC W/AUTO DIFF WBC: CPT

## 2021-11-30 PROCEDURE — 304965 HCHG RECOVERY SERVICES

## 2021-11-30 PROCEDURE — 59409 OBSTETRICAL CARE: CPT

## 2021-11-30 PROCEDURE — 770002 HCHG ROOM/CARE - OB PRIVATE (112)

## 2021-11-30 PROCEDURE — 700111 HCHG RX REV CODE 636 W/ 250 OVERRIDE (IP): Performed by: OBSTETRICS & GYNECOLOGY

## 2021-11-30 PROCEDURE — 700111 HCHG RX REV CODE 636 W/ 250 OVERRIDE (IP)

## 2021-11-30 PROCEDURE — 59400 OBSTETRICAL CARE: CPT | Performed by: NURSE PRACTITIONER

## 2021-11-30 PROCEDURE — 3E033VJ INTRODUCTION OF OTHER HORMONE INTO PERIPHERAL VEIN, PERCUTANEOUS APPROACH: ICD-10-PCS | Performed by: OBSTETRICS & GYNECOLOGY

## 2021-11-30 PROCEDURE — 36415 COLL VENOUS BLD VENIPUNCTURE: CPT

## 2021-11-30 PROCEDURE — 700101 HCHG RX REV CODE 250: Performed by: ANESTHESIOLOGY

## 2021-11-30 PROCEDURE — 303615 HCHG EPIDURAL/SPINAL ANESTHESIA FOR LABOR

## 2021-11-30 RX ORDER — ROPIVACAINE HYDROCHLORIDE 2 MG/ML
INJECTION, SOLUTION EPIDURAL; INFILTRATION; PERINEURAL CONTINUOUS
Status: DISCONTINUED | OUTPATIENT
Start: 2021-11-30 | End: 2021-12-01

## 2021-11-30 RX ORDER — SODIUM CHLORIDE, SODIUM LACTATE, POTASSIUM CHLORIDE, AND CALCIUM CHLORIDE .6; .31; .03; .02 G/100ML; G/100ML; G/100ML; G/100ML
1000 INJECTION, SOLUTION INTRAVENOUS
Status: DISCONTINUED | OUTPATIENT
Start: 2021-11-30 | End: 2021-12-01 | Stop reason: HOSPADM

## 2021-11-30 RX ORDER — ROPIVACAINE HYDROCHLORIDE 2 MG/ML
INJECTION, SOLUTION EPIDURAL; INFILTRATION; PERINEURAL
Status: COMPLETED
Start: 2021-11-30 | End: 2021-11-30

## 2021-11-30 RX ORDER — SODIUM CHLORIDE, SODIUM LACTATE, POTASSIUM CHLORIDE, CALCIUM CHLORIDE 600; 310; 30; 20 MG/100ML; MG/100ML; MG/100ML; MG/100ML
INJECTION, SOLUTION INTRAVENOUS CONTINUOUS
Status: DISCONTINUED | OUTPATIENT
Start: 2021-11-30 | End: 2021-12-01

## 2021-11-30 RX ORDER — SODIUM CHLORIDE, SODIUM LACTATE, POTASSIUM CHLORIDE, AND CALCIUM CHLORIDE .6; .31; .03; .02 G/100ML; G/100ML; G/100ML; G/100ML
250 INJECTION, SOLUTION INTRAVENOUS PRN
Status: DISCONTINUED | OUTPATIENT
Start: 2021-11-30 | End: 2021-12-01 | Stop reason: HOSPADM

## 2021-11-30 RX ORDER — ROPIVACAINE HYDROCHLORIDE 2 MG/ML
INJECTION, SOLUTION EPIDURAL; INFILTRATION PRN
Status: DISCONTINUED | OUTPATIENT
Start: 2021-11-30 | End: 2021-11-30 | Stop reason: SURG

## 2021-11-30 RX ORDER — LIDOCAINE HYDROCHLORIDE AND EPINEPHRINE 15; 5 MG/ML; UG/ML
INJECTION, SOLUTION EPIDURAL PRN
Status: DISCONTINUED | OUTPATIENT
Start: 2021-11-30 | End: 2021-11-30 | Stop reason: SURG

## 2021-11-30 RX ADMIN — ROPIVACAINE HYDROCHLORIDE 200 MG: 2 INJECTION, SOLUTION EPIDURAL; INFILTRATION at 20:41

## 2021-11-30 RX ADMIN — OXYTOCIN 125 ML/HR: 10 INJECTION, SOLUTION INTRAMUSCULAR; INTRAVENOUS at 23:21

## 2021-11-30 RX ADMIN — ROPIVACAINE HYDROCHLORIDE 10 ML: 2 INJECTION, SOLUTION EPIDURAL; INFILTRATION at 20:39

## 2021-11-30 RX ADMIN — SODIUM CHLORIDE, POTASSIUM CHLORIDE, SODIUM LACTATE AND CALCIUM CHLORIDE: 600; 310; 30; 20 INJECTION, SOLUTION INTRAVENOUS at 15:48

## 2021-11-30 RX ADMIN — OXYTOCIN 2000 ML/HR: 10 INJECTION, SOLUTION INTRAMUSCULAR; INTRAVENOUS at 22:11

## 2021-11-30 RX ADMIN — SODIUM CHLORIDE 5 MILLION UNITS: 900 INJECTION INTRAVENOUS at 15:50

## 2021-11-30 RX ADMIN — ROPIVACAINE HYDROCHLORIDE 200 MG: 2 INJECTION, SOLUTION EPIDURAL; INFILTRATION; PERINEURAL at 20:41

## 2021-11-30 RX ADMIN — OXYTOCIN 2 MILLI-UNITS/MIN: 10 INJECTION, SOLUTION INTRAMUSCULAR; INTRAVENOUS at 15:53

## 2021-11-30 RX ADMIN — LIDOCAINE HYDROCHLORIDE,EPINEPHRINE BITARTRATE 5 ML: 15; .005 INJECTION, SOLUTION EPIDURAL; INFILTRATION; INTRACAUDAL; PERINEURAL at 20:36

## 2021-11-30 RX ADMIN — SODIUM CHLORIDE, POTASSIUM CHLORIDE, SODIUM LACTATE AND CALCIUM CHLORIDE: 600; 310; 30; 20 INJECTION, SOLUTION INTRAVENOUS at 20:23

## 2021-11-30 RX ADMIN — SODIUM CHLORIDE 2.5 MILLION UNITS: 9 INJECTION, SOLUTION INTRAVENOUS at 19:53

## 2021-11-30 ASSESSMENT — LIFESTYLE VARIABLES
EVER_SMOKED: YES
ALCOHOL_USE: NO

## 2021-11-30 ASSESSMENT — COPD QUESTIONNAIRES
COPD SCREENING SCORE: 2
DURING THE PAST 4 WEEKS HOW MUCH DID YOU FEEL SHORT OF BREATH: NONE/LITTLE OF THE TIME
HAVE YOU SMOKED AT LEAST 100 CIGARETTES IN YOUR ENTIRE LIFE: YES
IN THE PAST 12 MONTHS DO YOU DO LESS THAN YOU USED TO BECAUSE OF YOUR BREATHING PROBLEMS: DISAGREE/UNSURE
DO YOU EVER COUGH UP ANY MUCUS OR PHLEGM?: NO/ONLY WITH OCCASIONAL COLDS OR INFECTIONS

## 2021-11-30 ASSESSMENT — PAIN SCALES - GENERAL
PAINLEVEL: 0 - NO PAIN
PAIN_LEVEL: 0

## 2021-11-30 NOTE — NON-PROVIDER
OB H&P:    CC: Elective IOL at 39w1d    HPI:  Ms. Zuly Wade is a 36 y.o.  @ 39w1d by US at 10w0d on 5/10/2021 with SHERITA 2021 who presents for elective IOL. She endorses some occasional cramping at home q20 min while resting, resolved when ambulating. Some mild spotting since her last cervical check last , resolving after a couple of days but returning this morning. She did receive prenatal care through [facility] and had anterior placenta on US but no previa. Some mild swelling in her knees and feet bilaterally. NIPT results were concerning, so amniocentesis was performed and was normal. No other current or prior pregnancy complications.    Contractions: No   Loss of fluid: No   Vaginal bleeding: Yes , spotting as above  Fetal movement: absent, decreased over last 2 weeks      PNC with [facility]    PNL:  Recent Labs     21  1719   ABOGROUP O   RUBELLAIGG 176.00   HEPBSAG Non-Reactive     Rh+, RI, HIV neg, TrepAb neg, HBsAg NR, GC/CT neg/neg  Glucola: Normal at 85 on 2021  GBS + at 36w2d on 11/10/2021      ROS:  Const: denies fevers, general concerns  CV/resp: reports no concerns  GI: denies abd pain, GI concerns  : see HPI  Neuro: denies HA/vision changes    OB History    Para Term  AB Living   3 1 1   1 1   SAB IAB Ectopic Molar Multiple Live Births     1       1      # Outcome Date GA Lbr Damián/2nd Weight Sex Delivery Anes PTL Lv   3 Current            2 Term 18 40w5d  3.52 kg (7 lb 12.2 oz) M Vag-Spont EPI N YULIET   1 IAB 13               GYN: denies STIs, no cervical procedures    History reviewed. No pertinent past medical history.    Past Surgical History:   Procedure Laterality Date   • LUMPECTOMY         No current facility-administered medications on file prior to encounter.     Current Outpatient Medications on File Prior to Encounter   Medication Sig Dispense Refill   • Prenatal Vit-Fe Fumarate-FA (PRENATAL ONE DAILY PO) Take  by mouth.    "      Family History   Problem Relation Age of Onset   • No Known Problems Mother    • No Known Problems Father    Maternal Grandfather--Diabetes, Heart Disease    Social History     Tobacco Use   • Smoking status: Former Smoker     Types: Cigarettes   • Smokeless tobacco: Never Used   Vaping Use   • Vaping Use: Never used   Substance Use Topics   • Alcohol use: Not Currently   • Drug use: Never         PE:  Vitals:    21 1410 21 1419   BP: 129/69 129/79   Pulse: 76 76   Resp:  20   Temp: 36.6 °C (97.8 °F) 36.6 °C (97.8 °F)   TempSrc: Temporal Temporal   Weight: 79.4 kg (175 lb)    Height: 1.651 m (5' 5\")      gen: AAO, NAD  CV/Pulm: Heart RRR, Lungs CTAB  abd: soft, gravid, NT, EFW 2542 grams, 13%ile per US 2021  Ext: NT, minimal lower extremity edema    SVE: 3/90/-2 @ 14:45  FHT: 120-130/moderate variability/+ accels/ - decels  Oliver: No contractions    A/P: 36 y.o.  @ 39w1d by US at 10w0d on 5/10/2021 with SHERITA 2021 who presents for elective IOL. GBS positive, no other complicating factors.  -Penicillin G q4 hrs x6 doses  -LR IVF at 125mL/hr  -Pitocin infusion for induction, 2x2 q30 min  -Clear liquid diet  -CBC  -Continuous fetal monitoring        Juwan Zamora"

## 2021-11-30 NOTE — H&P
"OB H&P:    CC: Scheduled IOL 2/2 EFW 15th percentile    HPI:  Ms. Zuly Wade is a 36 y.o.  @ 39w1d by US at 10w0d on 5/10/2021 with SHERITA 2021 who presents for elective IOL 2/2 EFW 15th percentile.    Contractions: Yes  \"cramping sensation\" similar to \"period cramps\"  Loss of fluid: No   Vaginal bleeding: Yes , spotting this AM.  Fetal movement: present    [facility]    PNL:  Recent Labs     21  1719   ABOGROUP O   RUBELLAIGG 176.00   HEPBSAG Non-Reactive     Rh+, RI, HIV neg, TrepAb neg, HBsAg NR, GC/CT neg/neg  Glucola: Normal at 85 on 2021  GBS + at 36w2d on 11/10/2021    ROS: Negative unless stated in HPI    OB History    Para Term  AB Living   3 1 1   1 1   SAB IAB Ectopic Molar Multiple Live Births     1       1      # Outcome Date GA Lbr Damián/2nd Weight Sex Delivery Anes PTL Lv   3 Current            2 Term 18 40w5d  3.52 kg (7 lb 12.2 oz) M Vag-Spont EPI N YULIET   1 IAB 13               GYN: denies STIs, no cervical procedures.    History reviewed. No pertinent past medical history.    Past Surgical History:   Procedure Laterality Date   • LUMPECTOMY         No current facility-administered medications on file prior to encounter.     Current Outpatient Medications on File Prior to Encounter   Medication Sig Dispense Refill   • Prenatal Vit-Fe Fumarate-FA (PRENATAL ONE DAILY PO) Take  by mouth.         Family History   Problem Relation Age of Onset   • No Known Problems Mother    • No Known Problems Father        Social History     Tobacco Use   • Smoking status: Former Smoker     Types: Cigarettes   • Smokeless tobacco: Never Used   Vaping Use   • Vaping Use: Never used   Substance Use Topics   • Alcohol use: Not Currently   • Drug use: Never         PE:  Vitals:    21 1410 21 1419   BP: 129/69 129/79   Pulse: 76 76   Resp:  20   Temp: 36.6 °C (97.8 °F) 36.6 °C (97.8 °F)   TempSrc: Temporal Temporal   Weight: 79.4 kg (175 lb)  " "  Height: 1.651 m (5' 5\")      gen: AAO, NAD  CV/Pulm: Heart RRR, Lungs CTAB  abd: soft, gravid, NT, EFW 2542 grams, 13%ile per US 2021  Ext: NT, minimal lower extremity edema     SVE: 3/90/-2 per RN exam  FHT: 120-130/moderate variability/+ accels/ - decels  Oliver: Absent     A/P: 36 y.o.  @ 39w1d by US at 10w0d on 5/10/2021 with SHERITA 2021 who presents for elective IOL 2/2 EFW 15th percentile.    -Admit to L&D  -Monitor for labor  -Continuous tocometry and fetal heart tracing  -GBS positive, penicillin x2  -Pitocin to be started after the second dose of penicillin in order to increase the likelihood the patient received 2 doses of penicillin prior to vaginal delivery  -AROM as indicated  -Epidural for pain management per pt request  -Clear liquid diet  -Ad luis. activity      Kiesha Keith M.D.      "

## 2021-12-01 LAB
ERYTHROCYTE [DISTWIDTH] IN BLOOD BY AUTOMATED COUNT: 42.4 FL (ref 35.9–50)
HCT VFR BLD AUTO: 33.9 % (ref 37–47)
HGB BLD-MCNC: 11.2 G/DL (ref 12–16)
MCH RBC QN AUTO: 28.5 PG (ref 27–33)
MCHC RBC AUTO-ENTMCNC: 33 G/DL (ref 33.6–35)
MCV RBC AUTO: 86.3 FL (ref 81.4–97.8)
PLATELET # BLD AUTO: 284 K/UL (ref 164–446)
PMV BLD AUTO: 10.7 FL (ref 9–12.9)
RBC # BLD AUTO: 3.93 M/UL (ref 4.2–5.4)
WBC # BLD AUTO: 10.4 K/UL (ref 4.8–10.8)

## 2021-12-01 PROCEDURE — A9270 NON-COVERED ITEM OR SERVICE: HCPCS | Performed by: NURSE PRACTITIONER

## 2021-12-01 PROCEDURE — A9270 NON-COVERED ITEM OR SERVICE: HCPCS | Performed by: STUDENT IN AN ORGANIZED HEALTH CARE EDUCATION/TRAINING PROGRAM

## 2021-12-01 PROCEDURE — 700102 HCHG RX REV CODE 250 W/ 637 OVERRIDE(OP): Performed by: NURSE PRACTITIONER

## 2021-12-01 PROCEDURE — 85027 COMPLETE CBC AUTOMATED: CPT

## 2021-12-01 PROCEDURE — 770002 HCHG ROOM/CARE - OB PRIVATE (112)

## 2021-12-01 PROCEDURE — 36415 COLL VENOUS BLD VENIPUNCTURE: CPT

## 2021-12-01 PROCEDURE — 700102 HCHG RX REV CODE 250 W/ 637 OVERRIDE(OP): Performed by: STUDENT IN AN ORGANIZED HEALTH CARE EDUCATION/TRAINING PROGRAM

## 2021-12-01 RX ORDER — VITAMIN A ACETATE, BETA CAROTENE, ASCORBIC ACID, CHOLECALCIFEROL, .ALPHA.-TOCOPHEROL ACETATE, DL-, THIAMINE MONONITRATE, RIBOFLAVIN, NIACINAMIDE, PYRIDOXINE HYDROCHLORIDE, FOLIC ACID, CYANOCOBALAMIN, CALCIUM CARBONATE, FERROUS FUMARATE, ZINC OXIDE, CUPRIC OXIDE 3080; 12; 120; 400; 1; 1.84; 3; 20; 22; 920; 25; 200; 27; 10; 2 [IU]/1; UG/1; MG/1; [IU]/1; MG/1; MG/1; MG/1; MG/1; MG/1; [IU]/1; MG/1; MG/1; MG/1; MG/1; MG/1
1 TABLET, FILM COATED ORAL
Status: DISCONTINUED | OUTPATIENT
Start: 2021-12-01 | End: 2021-12-02 | Stop reason: HOSPADM

## 2021-12-01 RX ORDER — BISACODYL 10 MG
10 SUPPOSITORY, RECTAL RECTAL PRN
Status: DISCONTINUED | OUTPATIENT
Start: 2021-12-01 | End: 2021-12-02 | Stop reason: HOSPADM

## 2021-12-01 RX ORDER — DOCUSATE SODIUM 100 MG/1
100 CAPSULE, LIQUID FILLED ORAL 2 TIMES DAILY PRN
Status: DISCONTINUED | OUTPATIENT
Start: 2021-12-01 | End: 2021-12-02 | Stop reason: HOSPADM

## 2021-12-01 RX ORDER — MISOPROSTOL 200 UG/1
600 TABLET ORAL
Status: DISCONTINUED | OUTPATIENT
Start: 2021-12-01 | End: 2021-12-02 | Stop reason: HOSPADM

## 2021-12-01 RX ORDER — ACETAMINOPHEN 500 MG
1000 TABLET ORAL EVERY 6 HOURS PRN
Status: DISCONTINUED | OUTPATIENT
Start: 2021-12-01 | End: 2021-12-02 | Stop reason: HOSPADM

## 2021-12-01 RX ORDER — IBUPROFEN 800 MG/1
800 TABLET ORAL EVERY 6 HOURS PRN
Status: DISCONTINUED | OUTPATIENT
Start: 2021-12-01 | End: 2021-12-01

## 2021-12-01 RX ORDER — IBUPROFEN 800 MG/1
800 TABLET ORAL EVERY 8 HOURS PRN
Status: DISCONTINUED | OUTPATIENT
Start: 2021-12-01 | End: 2021-12-02 | Stop reason: HOSPADM

## 2021-12-01 RX ORDER — ACETAMINOPHEN 325 MG/1
650 TABLET ORAL EVERY 6 HOURS PRN
Status: DISCONTINUED | OUTPATIENT
Start: 2021-12-01 | End: 2021-12-01

## 2021-12-01 RX ORDER — SODIUM CHLORIDE, SODIUM LACTATE, POTASSIUM CHLORIDE, CALCIUM CHLORIDE 600; 310; 30; 20 MG/100ML; MG/100ML; MG/100ML; MG/100ML
INJECTION, SOLUTION INTRAVENOUS PRN
Status: DISCONTINUED | OUTPATIENT
Start: 2021-12-01 | End: 2021-12-02 | Stop reason: HOSPADM

## 2021-12-01 RX ADMIN — IBUPROFEN 800 MG: 800 TABLET, FILM COATED ORAL at 14:19

## 2021-12-01 RX ADMIN — ACETAMINOPHEN 650 MG: 325 TABLET ORAL at 04:54

## 2021-12-01 RX ADMIN — ACETAMINOPHEN 1000 MG: 500 TABLET ORAL at 22:22

## 2021-12-01 RX ADMIN — IBUPROFEN 800 MG: 800 TABLET, FILM COATED ORAL at 22:22

## 2021-12-01 RX ADMIN — ACETAMINOPHEN 1000 MG: 500 TABLET ORAL at 11:18

## 2021-12-01 RX ADMIN — PRENATAL WITH FERROUS FUM AND FOLIC ACID 1 TABLET: 3080; 920; 120; 400; 22; 1.84; 3; 20; 10; 1; 12; 200; 27; 25; 2 TABLET ORAL at 08:52

## 2021-12-01 RX ADMIN — IBUPROFEN 800 MG: 800 TABLET, FILM COATED ORAL at 04:55

## 2021-12-01 ASSESSMENT — EDINBURGH POSTNATAL DEPRESSION SCALE (EPDS)
I HAVE BEEN SO UNHAPPY THAT I HAVE HAD DIFFICULTY SLEEPING: NOT AT ALL
I HAVE BEEN SO UNHAPPY THAT I HAVE BEEN CRYING: NO, NEVER
I HAVE BLAMED MYSELF UNNECESSARILY WHEN THINGS WENT WRONG: NO, NEVER
THINGS HAVE BEEN GETTING ON TOP OF ME: NO, I HAVE BEEN COPING AS WELL AS EVER
I HAVE FELT SAD OR MISERABLE: NO, NOT AT ALL
I HAVE BEEN ABLE TO LAUGH AND SEE THE FUNNY SIDE OF THINGS: AS MUCH AS I ALWAYS COULD
I HAVE LOOKED FORWARD WITH ENJOYMENT TO THINGS: AS MUCH AS I EVER DID
I HAVE BEEN ANXIOUS OR WORRIED FOR NO GOOD REASON: HARDLY EVER
I HAVE FELT SCARED OR PANICKY FOR NO GOOD REASON: NO, NOT MUCH
THE THOUGHT OF HARMING MYSELF HAS OCCURRED TO ME: NEVER

## 2021-12-01 ASSESSMENT — PAIN DESCRIPTION - PAIN TYPE
TYPE: ACUTE PAIN

## 2021-12-01 NOTE — ANESTHESIA PREPROCEDURE EVALUATION
Date: 11/30/21  Procedure: Labor Epidural         Relevant Problems   No relevant active problems   pregnancy, labor pain    Physical Exam    Airway   Mallampati: II  TM distance: >3 FB  Neck ROM: full       Cardiovascular - normal exam  Rhythm: regular  Rate: normal  (-) murmur     Dental - normal exam           Pulmonary - normal exam  Breath sounds clear to auscultation     Abdominal    Neurological - normal exam                 Anesthesia Plan    ASA 2       Plan - epidural   Neuraxial block will be labor analgesia                  Pertinent diagnostic labs and testing reviewed    Informed Consent:    Anesthetic plan and risks discussed with patient.

## 2021-12-01 NOTE — PROGRESS NOTES
-Received report from CODY Vizcarra. Assumed care of pt. POC discussed- will restart the pitocin and hang second dose of ABX at . Pt to consider AROM after that. Pt denies needs at this time. Encouraged to call with needs. Will monitor.    -  of a viable female by FREDY Carmona CNM

## 2021-12-01 NOTE — CARE PLAN
The patient is Stable - Low risk of patient condition declining or worsening    Shift Goals  Clinical Goals: Patient will maintain stable VS; Lochia WDL; Pain WDL  Patient Goals:   Family Goals: , pain well managed    Progress made toward(s) clinical / shift goals:    Problem: Pain - Standard  Goal: Alleviation of pain or a reduction in pain to the patient’s comfort goal  Outcome: Progressing     Problem: Knowledge Deficit - Standard  Goal: Patient and family/care givers will demonstrate understanding of plan of care, disease process/condition, diagnostic tests and medications  Outcome: Progressing     Problem: Knowledge Deficit - Postpartum  Goal: Patient will verbalize and demonstrate understanding of self and infant care  Outcome: Progressing     Problem: Psychosocial - Postpartum  Goal: Patient will verbalize and demonstrate effective bonding and parenting behavior  Outcome: Progressing     Problem: Altered Physiologic Condition  Goal: Patient physiologically stable as evidenced by normal lochia, palpable uterine involution and vitals within normal limits  Outcome: Progressing     Problem: Infection - Postpartum  Goal: Postpartum patient will be free of signs and symptoms of infection  Outcome: Progressing

## 2021-12-01 NOTE — ANESTHESIA TIME REPORT
Anesthesia Start and Stop Event Times     Date Time Event    11/30/2021 2028 Ready for Procedure     2031 Anesthesia Start     2207 Anesthesia Stop        Responsible Staff  11/30/21    Name Role Begin End    Israel Chen M.D. Anesth 2031 2207        Preop Diagnosis (Free Text):  Pre-op Diagnosis     Pregnacy, labor pain        Preop Diagnosis (Codes):    Premium Reason  A. 3PM - 7AM    Comments:

## 2021-12-01 NOTE — LACTATION NOTE
This note was copied from a baby's chart.  Mother reports baby has fed at the breast a few times since birth.    Progression of breastfeeding discussed with mother. Outlined the supportive measures that should be in place for the next few days, to include skin to skin and other basic strategies, hand expression and intrinsic factors (smell, touch, sight and visualization).     Encouraged parents to wake baby every few hours and stimulate her to provide opportunities to learn, explore and practice techniques.

## 2021-12-01 NOTE — PROGRESS NOTES
Obstetrics & Gynecology Post-Delivery Progress Note    Date of Service      36 y.o.  s/p vaginal, spontaneous  Delivery date:       Subjective  Pt reports   Pain: Yes,  controlled  Bleeding: lochia minimal  Tolerating PO: yes  Voiding: without difficulty  Ambulating: yes  Passing flatus: Yes  Feeding: breastfeeding well      Objective  24hr VS:  Temp:  [36.1 °C (97 °F)-36.8 °C (98.2 °F)] 36.1 °C (97 °F)  Pulse:  [] 70  Resp:  [16-20] 17  BP: (110-134)/(55-82) 120/74  SpO2:  [93 %-98 %] 93 %    Physical Exam  Gen: well  CV: RRR   Resp: unlabored respirations, no intercostal retractions or accessory muscle use  Abd: normal bowel sounds  Fundus: firm and at umbilicus  Incision: not applicable, (vaginal delivery)  Ext: symmetric and no edema, calves nontender    Labs:  Results for orders placed or performed during the hospital encounter of 21   Hold Blood Bank Specimen (Not Tested)   Result Value Ref Range    Holding Tube - Bb DONE    CBC WITH DIFFERENTIAL   Result Value Ref Range    WBC 8.1 4.8 - 10.8 K/uL    RBC 3.97 (L) 4.20 - 5.40 M/uL    Hemoglobin 11.2 (L) 12.0 - 16.0 g/dL    Hematocrit 34.1 (L) 37.0 - 47.0 %    MCV 85.9 81.4 - 97.8 fL    MCH 28.2 27.0 - 33.0 pg    MCHC 32.8 (L) 33.6 - 35.0 g/dL    RDW 43.0 35.9 - 50.0 fL    Platelet Count 304 164 - 446 K/uL    MPV 11.0 9.0 - 12.9 fL    Neutrophils-Polys 64.40 44.00 - 72.00 %    Lymphocytes 27.40 22.00 - 41.00 %    Monocytes 6.60 0.00 - 13.40 %    Eosinophils 0.60 0.00 - 6.90 %    Basophils 0.40 0.00 - 1.80 %    Immature Granulocytes 0.60 0.00 - 0.90 %    Nucleated RBC 0.00 /100 WBC    Neutrophils (Absolute) 5.23 2.00 - 7.15 K/uL    Lymphs (Absolute) 2.23 1.00 - 4.80 K/uL    Monos (Absolute) 0.54 0.00 - 0.85 K/uL    Eos (Absolute) 0.05 0.00 - 0.51 K/uL    Baso (Absolute) 0.03 0.00 - 0.12 K/uL    Immature Granulocytes (abs) 0.05 0.00 - 0.11 K/uL    NRBC (Absolute) 0.00 K/uL   CBC without differential   Result Value Ref Range    WBC 10.4 4.8  - 10.8 K/uL    RBC 3.93 (L) 4.20 - 5.40 M/uL    Hemoglobin 11.2 (L) 12.0 - 16.0 g/dL    Hematocrit 33.9 (L) 37.0 - 47.0 %    MCV 86.3 81.4 - 97.8 fL    MCH 28.5 27.0 - 33.0 pg    MCHC 33.0 (L) 33.6 - 35.0 g/dL    RDW 42.4 35.9 - 50.0 fL    Platelet Count 284 164 - 446 K/uL    MPV 10.7 9.0 - 12.9 fL         Medications  prenatal plus vitamin, 1 Tablet, Oral, Daily-0800      PRN medications: LR, oxytocin, misoprostol, docusate sodium, bisacodyl, magnesium hydroxide, acetaminophen, ibuprofen      Assessment/Plan  Zuly Wade is a 36 y.o.yo  postpartum day #1  s/p vaginal, spontaneous    - Post care: meeting all goals  - Pain: controlled  - Rh+, Rubella Immune  - Method of Feeding: plans to breastfeed  - Method of Contraception: vasectomy  VTE prophylaxis: none indicated    - Disposition: likely home PPD2     Kiesha Keith M.D.

## 2021-12-01 NOTE — CARE PLAN
Problem: Pain - Standard  Goal: Alleviation of pain or a reduction in pain to the patient’s comfort goal  Outcome: Progressing     Problem: Knowledge Deficit - Postpartum  Goal: Patient will verbalize and demonstrate understanding of self and infant care  Outcome: Progressing   The patient is Stable - Low risk of patient condition declining or worsening    Shift Goals  Clinical Goals: pain control, breastfeed  Patient Goals: pain control, breastfeed  Family Goals: pain control    Progress made toward(s) clinical / shift goals:  reports adeq pain relief w/ po poin meds, breastfeeding every 3 hrs and prn    Patient is not progressing towards the following goals:

## 2021-12-01 NOTE — L&D DELIVERY NOTE
Delivery Note    PATIENT ID:  NAME:  Zuly Wade  MRN:               [id number]  YOB: 1985      Labor Course: Pt admitted for elective IOL. Progressed with pitocin induction and SROM shortly before second stage.     On 2021 at 22:07, this 36 y.o.,  39w1d now  , GBS positive female delivered via  under epidural anesthesia a viable female infant weighing 6 lbs and 0.5 oz (2745 grams) with APGAR scores of 9 and 9 at one and five minutes. No nuchal cord.  Baby to maternal abdomen.  Spontaneous cry.  Mouth and nares bulb suctioned by RN. Delayed cord clamping occurred with cord doubly clamped by myself and cut by FOB.  Spontaneous delivery of placenta grossly intact at 22:11.  CVx3. Pitocin infusing in IVF.  FF and bleeding light.  Upon vaginal exam, there was tiny first degree perineal laceration that was hemostatic and not repaired in the usual sterile fashion. Pt and infant are stable and bonding.  Estimated blood loss: 100.      PHIL Vale Dr., Attending Physician

## 2021-12-01 NOTE — ANESTHESIA POSTPROCEDURE EVALUATION
Patient: Zuly Wade    Procedure Summary     Date: 11/30/21 Room / Location:     Anesthesia Start: 2031 Anesthesia Stop: 2207    Procedure: Labor Epidural Diagnosis:     Scheduled Providers:  Responsible Provider: Israel Chen M.D.    Anesthesia Type: epidural ASA Status: 2          Final Anesthesia Type: epidural  Last vitals  BP   Blood Pressure: 113/61    Temp   36.8 °C (98.2 °F)    Pulse   91   Resp   16    SpO2   98 %      Anesthesia Post Evaluation    Patient location during evaluation: PACU  Patient participation: complete - patient participated  Level of consciousness: awake and alert  Pain score: 0    Airway patency: patent  Anesthetic complications: no  Cardiovascular status: hemodynamically stable  Respiratory status: acceptable  Hydration status: euvolemic    PONV: none          No complications documented.

## 2021-12-01 NOTE — PROGRESS NOTES
"PATIENT ID:  NAME:  Zuly Wade  MRN:               [id number]  YOB: 1985    Subjective:   Pt recently got epidural and it is not fully working.     Objective:    Vitals:    21 1410 21 1419 21 1858   BP: 129/69 129/79 134/76   Pulse: 76 76 68   Resp:  20 16   Temp: 36.6 °C (97.8 °F) 36.6 °C (97.8 °F) 36.8 °C (98.2 °F)   TempSrc: Temporal Temporal Temporal   Weight: 79.4 kg (175 lb)     Height: 1.651 m (5' 5\")         Cervix:  6-7cm/90%/-1, intact membranes  [place]: Uterine Contractions Q 3-4 minutes.   FHRM: Baseline 120, moderate variability, Accels present, no decels   Pitocin: 3      Assessment:   36 y.o. female  at 39w1d.      Plan:   1. LR for IV hydration  2. Continued Pen G for GBS prophylaxis   3. Epidural recently placed, pt desires to wait for AROM until epidural working more effectively   4. Continuous fetal heart rate and maternal monitoring  5. Anticipate              "

## 2021-12-01 NOTE — PROGRESS NOTES
1400: Pt presents to L&D for scheduled IOL. Pt is a  at 39.1 weeks gestation. POC discussed, pt verbalized understanding.  1625: Pitocin stopped per Dr. Keith's order.

## 2021-12-01 NOTE — CARE PLAN
Problem: Knowledge Deficit - L&D  Goal: Patient and family/caregivers will demonstrate understanding of plan of care, disease process/condition, diagnostic tests and medications  Outcome: Met     Problem: Risk for Excess Fluid Volume  Goal: Patient will demonstrate pulse, blood pressure and neurologic signs within expected ranges and without any respiratory complications  Outcome: Met     Problem: Psychosocial - L&D  Goal: Patient's level of anxiety will decrease  Outcome: Met  Goal: Patient will be able to discuss coping skills during hospitalization  Outcome: Met  Goal: Patient's ability to re-evaluate and adapt role responsibilities will improve  Outcome: Met  Goal: Spiritual and cultural needs incorporated into hospitalization  Outcome: Met     Problem: Pain  Goal: Patient's pain will be alleviated or reduced to the patient’s comfort goal  Outcome: Progressing  Note: Pt desired epidural. Dr. Chen placed epidural without difficulty. Pt stated some relief from labor pain.     Problem: Risk for Injury  Goal: Patient and fetus will be free of preventable injury/complications  Outcome: Met  Note:  of a viable female by FREDY Carmona CNM. Apgars 9/9

## 2021-12-01 NOTE — ANESTHESIA PROCEDURE NOTES
Epidural Block    Date/Time: 11/30/2021 8:31 PM  Performed by: Israel Chen M.D.  Authorized by: Israel Chen M.D.     Patient Location:  OB  Start Time:  11/30/2021 8:31 PM  End Time:  11/30/2021 8:36 PM  Reason for Block: labor analgesia    patient identified, IV checked, site marked, risks and benefits discussed, surgical consent, monitors and equipment checked, pre-op evaluation and timeout performed    Patient Position:  Sitting  Prep: ChloraPrep, patient draped and sterile technique    Monitoring:  Blood pressure, continuous pulse oximetry and heart rate  Approach:  Midline  Location:  L3-L4  Injection Technique:  ERIC saline  Skin infiltration:  Lidocaine  Strength:  1%  Dose:  3ml  Needle Type:  Tuohy  Needle Gauge:  17 G  Needle Length:  3.5 in  Loss of resistance::  4  Catheter Size:  19 G  Catheter at Skin Depth:  11  Test Dose Result:  Negative

## 2021-12-01 NOTE — PROGRESS NOTES
0100 Assume care from L&D from CODY Abbott. Oriented patient to room,call light, and emergancy light. Assessment completed,fundus firm,lochia light. Plan of care reviewed, verbilized understanding. Patient denies pain at this time, will call if intervention needed.

## 2021-12-02 ENCOUNTER — PHARMACY VISIT (OUTPATIENT)
Dept: PHARMACY | Facility: MEDICAL CENTER | Age: 36
End: 2021-12-02
Payer: COMMERCIAL

## 2021-12-02 VITALS
TEMPERATURE: 97.3 F | DIASTOLIC BLOOD PRESSURE: 76 MMHG | BODY MASS INDEX: 29.16 KG/M2 | HEIGHT: 65 IN | RESPIRATION RATE: 18 BRPM | WEIGHT: 175 LBS | SYSTOLIC BLOOD PRESSURE: 123 MMHG | HEART RATE: 60 BPM | OXYGEN SATURATION: 96 %

## 2021-12-02 PROCEDURE — RXMED WILLOW AMBULATORY MEDICATION CHARGE: Performed by: STUDENT IN AN ORGANIZED HEALTH CARE EDUCATION/TRAINING PROGRAM

## 2021-12-02 PROCEDURE — A9270 NON-COVERED ITEM OR SERVICE: HCPCS | Performed by: STUDENT IN AN ORGANIZED HEALTH CARE EDUCATION/TRAINING PROGRAM

## 2021-12-02 PROCEDURE — A9270 NON-COVERED ITEM OR SERVICE: HCPCS | Performed by: NURSE PRACTITIONER

## 2021-12-02 PROCEDURE — 700102 HCHG RX REV CODE 250 W/ 637 OVERRIDE(OP): Performed by: NURSE PRACTITIONER

## 2021-12-02 PROCEDURE — 700102 HCHG RX REV CODE 250 W/ 637 OVERRIDE(OP): Performed by: STUDENT IN AN ORGANIZED HEALTH CARE EDUCATION/TRAINING PROGRAM

## 2021-12-02 RX ORDER — ACETAMINOPHEN 500 MG
1000 TABLET ORAL EVERY 6 HOURS PRN
Qty: 60 TABLET | Refills: 0 | Status: SHIPPED | OUTPATIENT
Start: 2021-12-02 | End: 2022-12-18

## 2021-12-02 RX ORDER — PSEUDOEPHEDRINE HCL 30 MG
100 TABLET ORAL 2 TIMES DAILY PRN
Qty: 60 CAPSULE | Refills: 0 | Status: SHIPPED | OUTPATIENT
Start: 2021-12-02 | End: 2022-12-18

## 2021-12-02 RX ORDER — IBUPROFEN 800 MG/1
800 TABLET ORAL EVERY 8 HOURS PRN
Qty: 60 TABLET | Refills: 0 | Status: SHIPPED | OUTPATIENT
Start: 2021-12-02 | End: 2022-12-18

## 2021-12-02 RX ADMIN — PRENATAL WITH FERROUS FUM AND FOLIC ACID 1 TABLET: 3080; 920; 120; 400; 22; 1.84; 3; 20; 10; 1; 12; 200; 27; 25; 2 TABLET ORAL at 08:44

## 2021-12-02 RX ADMIN — IBUPROFEN 800 MG: 800 TABLET, FILM COATED ORAL at 08:44

## 2021-12-02 NOTE — LACTATION NOTE
Mother reports that she is breastfeeding her  without difficulty, does report some mild discomfort. She denies any nipple damage. Lanolin use discussed, provided a tube with instructions.Resources for out-patient support discussed.  I offered to submit a referral for out-patient and she agreed.    I noticed parents using a pacifier and provided the Caution Regarding Pacifier Use hand-out. Mother says she plans to go home and pump milk so dad can give a bottle.

## 2021-12-02 NOTE — DISCHARGE PLANNING
Meds-to-Beds: Discharge prescription orders listed below delivered to patient's bedside. RN Roxi notified. Patient counseled. Patient elected to have co-payment billed to patient account.       Current Outpatient Medications   Medication Sig Dispense Refill   • docusate sodium 100 MG Cap Take 1 capsule by mouth 2 times a day as needed for Constipation. 60 Capsule 0   • acetaminophen (TYLENOL) 500 MG Tab Take 2 Tablets by mouth every 6 hours as needed for Mild Pain or Moderate Pain. 60 Tablet 0   • ibuprofen (MOTRIN) 800 MG Tab Take 1 Tablet by mouth every 8 hours as needed. 60 Tablet 0      Susan Clancy, PharmD

## 2021-12-02 NOTE — PROGRESS NOTES
Pt states understanding of all discharge info and follow up appts. Pt discharged to home with infant and fob.

## 2021-12-02 NOTE — DISCHARGE SUMMARY
Discharge Summary:     Date of Admission: 2021  Date of Discharge: 21      Admitting diagnosis:    1. Pregnancy @ 39w1d      Discharge Diagnosis:   1. Status post vaginal, spontaneous.    History reviewed. No pertinent past medical history.  OB History    Para Term  AB Living   3 2 2   1 2   SAB IAB Ectopic Molar Multiple Live Births     1     0 2      # Outcome Date GA Lbr Damián/2nd Weight Sex Delivery Anes PTL Lv   3 Term 21 39w1d 02:00 / 00:07 2.745 kg (6 lb 0.8 oz) F Vag-Spont EPI N YULIET   2 Term 18 40w5d  3.52 kg (7 lb 12.2 oz) M Vag-Spont EPI N YULIET   1 IAB 13             Past Surgical History:   Procedure Laterality Date   • LUMPECTOMY       Patient has no known allergies.    Patient Active Problem List   Diagnosis   • Nevus   • Group B streptococcal carriage complicating pregnancy       Hospital Course:   Pt is a 36 y.o. now  who presented for presents for elective induction of labor secondary to EFW in the 15th percentile.  The patient tested positive for GBS and was administered penicillin.  Pitocin was administered following penicillin in order to ensure the baby had protection against GBS during the delivery.  The patient delivered a female infant via  under epidural anesthesia.  The patient was found to have a first-degree perineal laceration that was hemostatic and not repaired.  The patient was transferred to postpartum for further monitoring.  The patient was discharged when deemed appropriate.      Physical Exam:  Temp:  [36.1 °C (97 °F)-36.8 °C (98.2 °F)] 36.3 °C (97.3 °F)  Pulse:  [60-76] 60  Resp:  [18] 18  BP: (109-123)/(71-79) 123/76  SpO2:  [95 %-96 %] 96 %  Physical Exam  General: well  Chest/Breasts: nipples intact and breasts engorged   Abdomen: nontender  Fundus: firm and below umbilicus  Incision: not applicable, (vaginal delivery)  Perineum: deferred  Extremities: symmetric and no edema, calves nontender    Current  Facility-Administered Medications   Medication Dose   • LR infusion     • PRN oxytocin (PITOCIN) (20 Units/1000 mL) PRN for excessive uterine bleeding - See Admin Instr  125-999 mL/hr   • miSOPROStol (CYTOTEC) tablet 600 mcg  600 mcg   • docusate sodium (COLACE) capsule 100 mg  100 mg   • bisacodyl (DULCOLAX) suppository 10 mg  10 mg   • magnesium hydroxide (MILK OF MAGNESIA) suspension 30 mL  30 mL   • prenatal plus vitamin (STUARTNATAL 1+1) 27-1 MG tablet 1 Tablet  1 Tablet   • acetaminophen (TYLENOL) tablet 1,000 mg  1,000 mg   • ibuprofen (MOTRIN) tablet 800 mg  800 mg   • oxytocin (PITOCIN) infusion (for postpartum)   mL/hr       Recent Labs     11/30/21  1440 12/01/21  0503   WBC 8.1 10.4   RBC 3.97* 3.93*   HEMOGLOBIN 11.2* 11.2*   HEMATOCRIT 34.1* 33.9*   MCV 85.9 86.3   MCH 28.2 28.5   MCHC 32.8* 33.0*   RDW 43.0 42.4   PLATELETCT 304 284   MPV 11.0 10.7         Activity/ Discharge Instructions::   Discharge to home  Pelvic Rest x 6 weeks  No heavy lifting x4 weeks  Call or come to ED for: heavy vaginal bleeding, fever >100.4, severe abdominal pain, severe headache, chest pain, shortness of breath,  N/V, incisional drainage, or other concerns.       Follow up:  [facility] in 5 weeks for vaginal delivery    Discharge Meds:   Current Outpatient Medications   Medication Sig Dispense Refill   • docusate sodium 100 MG Cap Take 100 mg by mouth 2 times a day as needed for Constipation. 60 Capsule 0   • acetaminophen (TYLENOL) 500 MG Tab Take 2 Tablets by mouth every 6 hours as needed for Mild Pain or Moderate Pain. 60 Tablet 0   • ibuprofen (MOTRIN) 800 MG Tab Take 1 Tablet by mouth every 8 hours as needed. 60 Tablet 0       Kiesha Keith M.D.

## 2021-12-02 NOTE — CARE PLAN
The patient is Stable - Low risk of patient condition declining or worsening    Shift Goals  Clinical Goals: pain control, breastfeed  Patient Goals: pain control, breastfeed  Family Goals: pain control    Progress made toward(s) clinical / shift goals:    Problem: Pain - Standard  Goal: Alleviation of pain or a reduction in pain to the patient’s comfort goal  Outcome: Progressing     Problem: Knowledge Deficit - Standard  Goal: Patient and family/care givers will demonstrate understanding of plan of care, disease process/condition, diagnostic tests and medications  Outcome: Progressing     Problem: Knowledge Deficit - Postpartum  Goal: Patient will verbalize and demonstrate understanding of self and infant care  Outcome: Progressing     Problem: Psychosocial - Postpartum  Goal: Patient will verbalize and demonstrate effective bonding and parenting behavior  Outcome: Progressing     Problem: Altered Physiologic Condition  Goal: Patient physiologically stable as evidenced by normal lochia, palpable uterine involution and vitals within normal limits  Outcome: Progressing     Problem: Infection - Postpartum  Goal: Postpartum patient will be free of signs and symptoms of infection  Outcome: Progressing       Patient is not progressing towards the following goals:

## 2021-12-02 NOTE — DISCHARGE INSTRUCTIONS
PATIENT DISCHARGE EDUCATION INSTRUCTION SHEET  REASONS TO CALL YOUR OBSTETRICIAN  · Persistent fever, shaking, chills (Temperature higher than 100.4) may indicate you have an infection  · Heavy bleeding: soaking more than 1 pad per hour; Passing clots an egg-sized clot or bigger may mean you have an postpartum hemorrhage  · Foul odor from vagina or bad smelling or discolored discharge or blood  · Breast infection (Mastitis symptoms); breast pain, chills, fever, redness or red streaks, may feel flu like symptoms  · Urinary pain, burning or frequency  · Incision that is not healing, increased redness, swelling, tenderness or pain, or any pus from episiotomy or  site may mean you have an infection  · Redness, swelling, warmth, or painful to touch in the calf area of your leg may mean you have a blood clot  · Severe or intensified depression, thoughts or feelings of wanting to hurt yourself or someone else   · Pain in chest, obstructed breathing or shortness of breath (trouble catching your breath) may mean you are having a postpartum complication. Call your provider immediately   · Headache that does not get better, even after taking medicine, a bad headache with vision changes or pain in the upper right area of your belly may mean you have high blood pressure or post birth preeclampsia. Call your provider immediately    HAND WASHING  All family and friends should wash their hands:  · Before and after holding the baby  · Before feeding the baby  · After using the restroom or changing the baby's diaper    WOUND CARE  Ask your physician for additional care instructions. In general:  ·  Incision:  · May shower and pat incision dry   · Keep the incision clean and dry  · There should not be any opening or pus from the incision  · Continue to walk at home 3 times a day   · Do NOT lift anything heavier than your baby (over 10 pounds)  · Encourage family to help participate in care of the  to allow  rest and mom time to heal  · Episiotomy/Laceration  · May use raymundo-spray bottle, witch hazel pads and dermaplast spray for comfort  · Use raymundo-spray bottle after urinating to cleanse perineal area  · To prevent burning during urination spray raymundo-water bottle on labial area   · Pat perineal area dry until episiotomy/laceration is healed  · Continue to use raymundo-bottle until bleeding stops as needed  · If have a 2nd degree laceration or greater, a Sitz bath can offer relief from soreness, burning, and inflammation   · Sitz Bath   · Sit in 6 inches of warm water and soak laceration as needed until the laceration heals    VAGINAL CARE AND BLEEDING  · Nothing inside vagina for 6 weeks:   · No sexual intercourse, tampons or douching  · Bleeding may continue for 2-4 weeks. Amount and color may vary  · Soaking 1 pad or more in an hour for several hours is considered heavy bleeding  · Passing large egg sized blood clots can be concerning  · If you feel like you have heavy bleeding or are having increasing amount of blood clots call your Obstetrician immediately  · If you begin feeling faint upon standing, feeling sick to your stomach, have clammy skin, a really fast heartbeat, have chills, start feeling confused, dizzy, sleepy or weak, or feeling like you're going to faint call your Obstetrician immediately    HYPERTENSION   Preeclampsia or gestational hypertension are types of high blood pressure that only pregnant women can get. It is important for you to be aware of symptoms to seek early intervention and treatment. If you have any of these symptoms immediately call your Obstetrician    · Vision changes or blurred vision   · Severe headache or pain that is unrelieved with medication and will not go away  · Persistent pain in upper abdomen or shoulder   · Increased swelling of face, feet, or hands  · Difficulty breathing or shortness of breath at rest  · Urinating less than usual    URINATION AND BOWEL MOVEMENTS  · Eating  "more fiber (bran cereal, fruits, and vegetables) and drinking plenty of fluids will help to avoid constipation  · Urinary frequency and urgency after childbirth is normal  · If you experience any urinary pain, burning or frequency call your provider    BIRTH CONTROL  · It is possible to become pregnant at any time after delivery and while breastfeeding  · Plan to discuss a method of birth control with your physician at your post delivery follow up visit    POSTPARTUM BLUES  During the first few days after birth, you may experience a sense of the \"blues\" which may include impatience, irritability or even crying. These feelings come and go quickly. However, as many as 1 in 10 women experience emotional symptoms known as postpartum depression.     POSTPARTUM DEPRESSION    May start as early as the second or third day after delivery or take several weeks or months to develop. Symptoms of \"blues\" are present, but are more intense: Crying spells; loss of appetite; feelings of hopelessness or loss of control; fear of touching the baby; over concern or no concern at all about the baby; little or no concern about your own appearance/caring for yourself; and/or inability to sleep or excessive sleeping. Contact your Obstetrician if you are experiencing any of these symptoms     PREVENTING SHAKEN BABY  If you are angry or stressed, PUT THE BABY IN THE CRIB, step away, take some deep breaths, and wait until you are calm to care for the baby. DO NOT SHAKE THE BABY. You are not alone, call a supporter for help.  · [facility] 24/7 crisis call line ([phone]) or ([phone])  · You can also text them, text \"ANSWER\" (441968)      "

## 2021-12-03 ENCOUNTER — NON-PROVIDER VISIT (OUTPATIENT)
Dept: OBGYN | Facility: CLINIC | Age: 36
End: 2021-12-03
Payer: COMMERCIAL

## 2021-12-03 NOTE — PROGRESS NOTES
Summary: Zuly exclusively  until shortly before discharge. Mother repors baby was showing signs that she needed more food. Began supplementing with small amounts of formula. This is her second baby. Difficulty latching first baby, pumped and bottle fed breastmilk and formula until 3 months. Supply diminished shortly after starting birth control. Since coming home from the hospital she has been breastfeeding every 2-3 hours, up to 10-15 minutes on each side. Offering 15mls after the breast if showing signs of hunger or frustration.    Today: Latched to the right side, cross cradle, for approximately 8 minutes, transferred 10mls. Latched to the left breast, cross cradle, from 13 minutes and transferred 20mls. Attempted to latch to the right again without success. Pumped with Medela Max Flow for 10 minutes, yielding 8mls between both breast.    Plan:Breastfeed every 1.5-2.5 hours throughout the day. Feed every 3 hours at night, with one 4 hour stretch if baby is able. Offer both breast at every feeding up to 10-15 minutes on each side. Pump 2-4x every 24 hours to be proactive with supply. Offer top off of 10-15mls if needed.   Follow up:  Pediatrician: Monday, 2021  Lactation: 3-5 days or As Needed     Subjective:     Zuly Wade is a 36 y.o. female here for lactation care. She is here today with babyMilena.    Concerns: nipple pain, feeling that there is not enough milk and history of breastfeeding challenges      HPI:   Pertinent  history:   Mother does not have a history of advanced maternal age, GDM, hypertension prior to pregnancy, insulin resistance, multiple gestation, PCOS and thyroid disease. Common condition(s) which may interfere with milk supply.    Breast changes in pregnancy: Yes  History of breast surgeries: Yes, reduction at age 21     FEEDING HISTORY:    Past breastfeeding history: Second baby. Difficulty latching first baby, pumped and bottle fed breastmilk and  formula until 3 months. Supply diminished shortly after starting birth control.   Hospital course: Exclusively  until shortly before discharge. Mother repors baby was showing signs that she needed more food. Began supplementing with small amounts of formula.   Currently 12/3/2021: Breastfeeding every 2-3 hours, up to 10-15 minutes on each side. Offering 15mls after the breast if showing signs of hunger or frustration.      Both breasts: Yes, more difficulty on the right side  Bottle feeds: 4-6/24h    Supplement: Supplementation initiated inpatient and Formula  Quantity: 15mls  How given/devices:  Bottle    Nipple Shield Use: None    Breast Pumping:   Not pumping  Frequency: N/A  Type of pump: Medela Max Flow   Flange size/type: 24mm    Maternal ROS:  Constitutional: No fever, chills. Feeling well  Breasts: No soreness of breasts and Soreness of nipples  Psychiatric: Experiencing anxiety and Managing ok, anxiety mostly around breastfeeding due to previous experience  Mental Health: No mention of feeling irritable, agitated, angry, overwhelmed, apathy, exhaustion nor having sleep changes outside infant feeds/demands or appetite changes     Objective:     Maternal Physical   General: No acute distress  Breasts: Symetrical , Soft, Plugged Duct - no evidence and Mastitis  - no S/S  Nipples: intact  Psychiatric: Normal mood and affect. Her behavior is normal. Judgment and thought content normal   Mental Health: Did NOT exhibit sadness, crying, feeling overwhelmed, agitation or hypervigilance.    Assessment/Plan & Lactation Counseling:     Infant exam on infant chart    Infant Weight History:   11/30/2021: 6# 0.8oz  12/03/2021: 5# 11.4oz (Ped)  12/03/2021: 5# 11.4oz    Infant intake at Breast:  R  10mls     L  20mls    Total: 30mls  Milk Transfer at this feeding:   Effective breastfeeding  Pumped: Type of Pump: Medela    Quantity Pumped:   Total: 8mls  Initiation of Feeding: Infant initiates  Position of Feeding:     Right: cross cradle  Left: cross cradle  Attachment Achieved: rapidly  Nipple shield: N/A   Suck Pattern at the breast: Suck burst and normal rest  Suck Pattern on the bottle: N/A  Behavior Following Observed Feeding: sleeping  Nipple Pain: Yes  Nipple Pain from:Nipple damage from accumulated microtrauma which lowered failure strength resulting in sudden damage     Latch: Assisted latch  Suckling/Feeding: attaches, baby falls asleep, baby fed effectively, baby roots, elicits ATIYA, intermittent swallows and rhythmic  Milk Supply Available: normal, establishing on day 3      BREASTFEEDING PLAN  Discussed concerns and symptoms as listed above in assessment and guidance summarized below.  Shared decision making was used between myself and the family for this encounter, home care, and follow up.  Topics reviewed included:  • Herbs and medications  A galactagogue is an herb or medication taken by a breastfeeding mother to increase her milk supply. We know that for centuries mothers around the world have sought out remedies to increase their milk supply. However, there is limited research on the safety and effectiveness of herbal galactagogues, which makes it hard for us to endorse them. It is not known if any of these herbs are truly effective, and it is difficult to predict how a specific herbal galactagogue will affect an individual, requiring “trial and error” in many situations. When effective, results are generally seen within 24-72 hours of starting an herbal galactagogue. Many of these herbs are used to decrease high blood sugar. If you are diabetic or have problems with low blood sugar, or thyroid disease  discuss the use of an herbal galactagogue with your health care provider. Not all women can increase their low milk supply with a galactagogue due to the many underlying causes of low milk production.  When taking a galactagogue, remember that frequent milk removal is still the most effective way to increase  supply.    • Self -care through relational support and interaction.   o Encouraged  support, rest, getting out of the house each day, walk or drive somewhere,  a coffee/tea at a drive through  o Allow others to bring you food, help with chores and errands, meeting for a cup of coffee  • Milk supply is dependent on glandular tissue development, hormonal influences, how many times the baby removes milk and how well the breasts are emptied in a 24 hour period. This is a biological reality that we can NOT work around. If, for any reason, your baby is not latching, or you are not able to nurse, then it is important for you to remove the milk instead by pumping or hand expression.  There's no magic trick, tea, food, drink, cookie or supplement that will increase your milk supply. One  must  effectively remove milk to continue to make and maximize milk. In the early days and weeks that can be 8+ times in 24 hours. For older babies, on average 6-7 + times in 24 hours.    • Feeding:   o Feed your baby every 1.5-2.5 hours during the day, every 3 hours at night   o Until back to birth weight, ONE four hour at night is acceptable if has had 8 prior feedings in 24 hours.    o Need to get in 10-12 feedings per 24 hours.   • Supplement:   o No supplement is needed  o Supplement with expressed milk or formula if needed  - If still showing hunger cues or frustration, offer 10-15mls after the breast  • When bottle-feeding, there are three primary things to consider:    o Nipple Shape:  - Look for a nipple that looks like a “breast at work” not a “breast at rest.”  A “breast at work” has a somewhat cone shape as the nipple and breast tissue is pulled into the baby’s mouth while feeding.  Your baby’s mouth should be able to go around the widest part of the nipple to form a wide-open gape on the bottle like that of a good latch at the breast. In contrast, a breast at rest might look more like, well, a breast: a roundish base with  a  nipple.  If the bottle looks like this, your baby’s lips may not be able to get around the widest part of the nipple because it is just too wide resulting in a narrow gape that would hurt your nipple. This nipple shape may also make it difficult for your baby to make a complete seal with their lips which leads to air intake and milk spillage.Wide or narrow neck bottles are your choice.   o Flow Rate of the Nipple:  - The nipple flow should be slow.  Don’t just read the label, but notice how your baby is feeding and trust what you observe.  A study done a few years ago found that “slow flow” varied widely between brands and even between nipples of the same brand.  Try several nipples until you find one that results in a rhythmic sucking pattern but not chugging and gulping.  o Pacing the feeding:  - A slow flow nipple helps, but how you feed the baby is more important.  Good positioning can compensate for a faster flow nipple.  When bottle-feeding, the baby should control how much is consumed at a feeding.  Holding the baby in an upright position with the bottle horizontal ensures that the baby gets milk only when sucking.  Here is a nice video demonstrating this concept of paced bottle feeding,  https://www.youtube.com/watch?v=KfQIK4bZE0Q  • Pacifier Use:  The American [employer]ians' Position Paper reports: Although we recommend a conservative approach regarding pacifier use, we do not endorse a complete ban on the use of pacifiers, nor do we support an approach that induces parental guilt concerning their choices about the use of pacifiers.  • Positioning Techniques   o Suggested positions Cross cradle and Football  o Fine tune position by making sure your fingers beneath the breast as well as your bra, are out of the way of your baby's chin.  o Positioning:  Many positions shown, great sidelying at 7 minutes.   o See  "http://globalhealthmedia.org/portfolio-items/positions-[email or web address]-breastfeeding/?[email or web address]  • Latch on Techniques   o Fine tune latch:  - By holding your baby more securely at the breast, assisting your baby to stay attached by:  • Bringing your baby to your breast, not breast to the baby  • Your baby's cheek to touch breast securely, nose tipped back  • Hold your baby firmly in place so when your baby forgets to suck and picks it back up again your baby is in the correct spot. You will be extinguishing behavior and replacing it with a deeper latch to stimulate suck and provide satisfaction at the breast.  • Your baby needs as much breast as deep in the mouth as possible to allow your nipples to heal and for you more importantly to maximize efficiency at the breast  o Latch is asymmetrical, leading with the chin, getting the baby below the breast, as if offering a large \"deli shayla sandwich\".  • Pumping Guidelines:  o Both breasts   o Pump 2-4 times in 24 hours  - Only after breastfeeding OR in place of breastfeeding. NOT in between feedings.   o Type of pump:  • Medela   o Always double pump  o How long will vary woman to woman, typically 8-15 minutes, or 1-2 minutes after flow slows  o Flange Fit: Freely moving nipple in the tunnel with some movement of the areola.  - Today's evaluation indicates appropriate flange    • Storage (Acceptable guidelines for healthy term babies)  o 10 hours at room temp including your pieces, may rinse but not mandatory  o 8 days refrigerator, don't need  to refrigerate right away if using fresh pumped milk at the next feeding  o Freezer 1 year  o Deep freezer 2 years  o American Academy or Pediatrics has said you may mix different temperature milks.   o If baby drinks breastmilk from a fresh or refrigerated bottle of breastmilk,  you may return the unused portion to the refrigerator and use once at next feeding.     • Increasing supply besides Galactogogues and Pumping: "   o Warmth  o Relaxation   o Physical, auditory narratives  o Childbirth relaxation Techniques  o Acupuncture and acupressure  o Shoulder Massages  o Take a nap    • Nipple care:  o May apply breastmilk  o Moist-oily ointment after feeding/pumping, ie Lanolin nipple butter, coconut or olive oil, if desired/needed 2-3 times/day until nipples are healed  o You do not need to wash this off before pumping or feeding the baby  o You may want to remove baby from breast when active swallowing stops to avoid prolonged nipple compression    • Connect with other mothers:  o Facebook:   - [place] [facility]: [email or web address]  - Well-Nourished Babies (Private group for questions and support): https://www.facebook.com/groups/978742751254545/  o [facility] [place] [facility]  - Tuesdays 10am - 11am. Women's Health at [address], 3rd floor conference room  - Come and check your baby's weight, do a feeding and see how your baby is growing, visit with other mothers, plan on a walk or coffee date after group.  • Please wear a mask  • Due to space limitations - no strollers please (Fresh c/section moms should use the stroller)  • We would love to have dads stay, but moms won't breastfeed.  • The room is only available from 10am -11am, there is a meeting prior and after.   • All diapers must be taken with you   o [facility] [place] [facility]  - This is an emotional, informational and safe support [place] with your like-minded community that builds solidarity among moms  - The honest experiences of mothers are highly valued among the other mothers  - Tuesday  at 11am by Rafa,  you will be sent an invitation each week, please unsubscribe as you wish  - You may instead copy and paste this link: [email or web address]    - Passcode  [id number]  - You may share this link with friends; please don't post on social media.      In Conclusion:   Family  present has verbalized what they can realistically do based on family dynamics, understanding a plan has to be doable to be effective and can be renegotiated at any time.    This is a complex and intimate journey. When obstacles present themselves, it takes confidence, persistence and support. You are now the focus of our Breastfeeding Medicine team; we are here to support your decisions and goals.      Follow up requires close monitoring in this time sensitive window of opportunity to establish milk supply and facilitate the learning of  breastfeeding.    Mom is encouraged to e-mail to update how the plan is working.    Pediatrician appointment: Monday, December 6, 2021    Follow-up for infant weight check and dyad breastfeeding evaluation in 3-5 day(s) or As Needed  Please call [phone] if you have not scheduled your next appointment    A total of 65 minutes, not including infant assessment time, with more than 50% was spent preparing to see the patient, obtaining and reviewing separately obtained history, performing a medically appropriate examination and evaluation, counseling and educating the family, referring and communicating with other health care professionals, documenting clinical information in the electronic health record, independently interpreting weighted feeds and infant growth results, communicating these results to the family and care coordination as detailed in the above note.       Radha Merino

## 2021-12-14 ENCOUNTER — APPOINTMENT (OUTPATIENT)
Dept: DERMATOLOGY | Facility: IMAGING CENTER | Age: 36
End: 2021-12-14
Payer: COMMERCIAL

## 2022-12-18 ENCOUNTER — OFFICE VISIT (OUTPATIENT)
Dept: URGENT CARE | Facility: PHYSICIAN GROUP | Age: 37
End: 2022-12-18
Payer: COMMERCIAL

## 2022-12-18 VITALS
TEMPERATURE: 97.7 F | OXYGEN SATURATION: 99 % | HEART RATE: 74 BPM | WEIGHT: 138 LBS | SYSTOLIC BLOOD PRESSURE: 112 MMHG | DIASTOLIC BLOOD PRESSURE: 64 MMHG | HEIGHT: 66 IN | BODY MASS INDEX: 22.18 KG/M2 | RESPIRATION RATE: 16 BRPM

## 2022-12-18 DIAGNOSIS — J02.0 PHARYNGITIS DUE TO STREPTOCOCCUS SPECIES: ICD-10-CM

## 2022-12-18 LAB
INT CON NEG: NEGATIVE
INT CON POS: POSITIVE
S PYO AG THROAT QL: POSITIVE

## 2022-12-18 PROCEDURE — 87880 STREP A ASSAY W/OPTIC: CPT | Performed by: FAMILY MEDICINE

## 2022-12-18 PROCEDURE — 99213 OFFICE O/P EST LOW 20 MIN: CPT | Performed by: FAMILY MEDICINE

## 2022-12-18 RX ORDER — AMOXICILLIN 500 MG/1
500 CAPSULE ORAL 2 TIMES DAILY
Qty: 20 CAPSULE | Refills: 0 | Status: SHIPPED | OUTPATIENT
Start: 2022-12-18 | End: 2022-12-28

## 2022-12-18 ASSESSMENT — ENCOUNTER SYMPTOMS: SORE THROAT: 1

## 2022-12-18 NOTE — PROGRESS NOTES
Subjective:     Zuly Wade is a 37 y.o. female who presents for Ear Pain (X 4 days with sore throat and bilateral ear pain)    HPI  Pt presents for evaluation of an acute problem  Patient with ear pain for the past 4 days  Also having sore throat  Sore throat is worse on right   Has bilateral throat pain and bilateral ear pain   Pain is much worse with swallowing  No cough  Child is currently ill with RSV    Review of Systems   HENT:  Positive for ear pain and sore throat.    Skin:  Negative for rash.     PMH:  has no past medical history of Addisons disease (HCC), Adrenal disorder (HCC), Allergy, Anemia, Anxiety, Arrhythmia, Arthritis, Asthma, Blood transfusion without reported diagnosis, Cancer (HCC), Cataract, CHF (congestive heart failure) ([facility]), Clotting disorder (HCC), COPD (chronic obstructive pulmonary disease) ([facility]), Cushings syndrome (HCC), Depression, Diabetes ([facility]), Diabetic neuropathy ([facility]), GERD (gastroesophageal reflux disease), Glaucoma, Goiter, Head ache, Heart attack ([facility]), Heart murmur, HIV (human immunodeficiency virus infection) ([facility]), Hyperlipidemia, Hypertension, IBD (inflammatory bowel disease), Kidney disease, Meningitis, Migraine, Muscle disorder, Osteoporosis, Parathyroid disorder (HCC), Pituitary disease (HCC), Pulmonary emphysema (HCC), Seizure ([facility]), Sickle cell disease (HCC), Stroke (HCC), Substance abuse (HCC), Thyroid disease, Tuberculosis, or Urinary tract infection.  MEDS: No current outpatient medications on file.  ALLERGIES: No Known Allergies  SURGHX:   Past Surgical History:   Procedure Laterality Date    LUMPECTOMY       SOCHX:  reports that she has quit smoking. Her smoking use included cigarettes. She has never used smokeless tobacco. She reports that she does not currently use alcohol. She reports that she does not use drugs.     Objective:   /64 (BP Location: Left arm, Patient Position: Sitting, BP Cuff Size: Adult)   Pulse 74   Temp 36.5 °C (97.7 °F)  "(Temporal)   Resp 16   Ht 1.676 m (5' 6\")   Wt 62.6 kg (138 lb)   SpO2 99%   BMI 22.27 kg/m²     Physical Exam  Constitutional:       General: She is not in acute distress.     Appearance: She is well-developed. She is not diaphoretic.   HENT:      Head: Normocephalic and atraumatic.      Right Ear: Tympanic membrane, ear canal and external ear normal.      Left Ear: Tympanic membrane, ear canal and external ear normal.      Nose: Nose normal.      Mouth/Throat:      Mouth: Mucous membranes are moist.      Comments: Moderate erythema in bilateral posterior pharynx with moderate purulent exudates, no uvular swelling, no unilateral swelling  Pulmonary:      Effort: Pulmonary effort is normal.   Musculoskeletal:      Cervical back: Normal range of motion and neck supple. Tenderness present.   Lymphadenopathy:      Cervical: Cervical adenopathy present.   Neurological:      Mental Status: She is alert.       Assessment/Plan:   Assessment    1. Pharyngitis due to Streptococcus species  - POCT Rapid Strep A  - amoxicillin (AMOXIL) 500 MG Cap; Take 1 Capsule by mouth 2 times a day for 10 days.  Dispense: 20 Capsule; Refill: 0    Patient with strep pharyngitis.  Point-of-care strep positive.  No peritonsillar abscess or other concerning features.  Reviewed supportive care measures and treatment with amoxicillin.  Follow-up in the urgent care as needed.  "

## 2023-04-02 ENCOUNTER — OFFICE VISIT (OUTPATIENT)
Dept: URGENT CARE | Facility: PHYSICIAN GROUP | Age: 38
End: 2023-04-02
Payer: COMMERCIAL

## 2023-04-02 ENCOUNTER — HOSPITAL ENCOUNTER (OUTPATIENT)
Facility: MEDICAL CENTER | Age: 38
End: 2023-04-02
Attending: PHYSICIAN ASSISTANT
Payer: COMMERCIAL

## 2023-04-02 VITALS
TEMPERATURE: 98.1 F | WEIGHT: 147.49 LBS | RESPIRATION RATE: 14 BRPM | DIASTOLIC BLOOD PRESSURE: 60 MMHG | HEIGHT: 66 IN | HEART RATE: 90 BPM | OXYGEN SATURATION: 97 % | SYSTOLIC BLOOD PRESSURE: 102 MMHG | BODY MASS INDEX: 23.7 KG/M2

## 2023-04-02 DIAGNOSIS — J03.90 EXUDATIVE TONSILLITIS: ICD-10-CM

## 2023-04-02 LAB — S PYO DNA SPEC NAA+PROBE: NOT DETECTED

## 2023-04-02 PROCEDURE — 99214 OFFICE O/P EST MOD 30 MIN: CPT | Performed by: PHYSICIAN ASSISTANT

## 2023-04-02 PROCEDURE — 87070 CULTURE OTHR SPECIMN AEROBIC: CPT

## 2023-04-02 PROCEDURE — 87651 STREP A DNA AMP PROBE: CPT | Performed by: PHYSICIAN ASSISTANT

## 2023-04-02 PROCEDURE — 87077 CULTURE AEROBIC IDENTIFY: CPT

## 2023-04-02 RX ORDER — AMOXICILLIN 500 MG/1
500 CAPSULE ORAL 2 TIMES DAILY
Qty: 20 CAPSULE | Refills: 0 | Status: SHIPPED | OUTPATIENT
Start: 2023-04-02 | End: 2023-04-12

## 2023-04-02 ASSESSMENT — ENCOUNTER SYMPTOMS
SHORTNESS OF BREATH: 0
COUGH: 1
TROUBLE SWALLOWING: 0
SWOLLEN GLANDS: 1
HOARSE VOICE: 1

## 2023-04-02 NOTE — PROGRESS NOTES
Subjective:   Zuly Wade is a 37 y.o. female who presents for Pharyngitis (X 4 days , fever and congestion . ) and Cough        Pharyngitis   This is a new problem. Episode onset: 4 days. The problem has been gradually worsening. The pain is worse on the right side. There has been no fever. The pain is severe. Associated symptoms include coughing (due to throat discomfort, non productive), a hoarse voice and swollen glands. Pertinent negatives include no congestion, drooling, shortness of breath or trouble swallowing. Associated symptoms comments: Pain with swallowing. She has had no exposure to strep or mono.   Review of Systems   HENT:  Positive for hoarse voice. Negative for congestion, drooling and trouble swallowing.    Respiratory:  Positive for cough (due to throat discomfort, non productive). Negative for shortness of breath.      PMH:  has no past medical history of Addisons disease ([facility]), Adrenal disorder ([facility]), Allergy, Anemia, Anxiety, Arrhythmia, Arthritis, Asthma, Blood transfusion without reported diagnosis, Cancer ([facility]), Cataract, CHF (congestive heart failure) ([facility]), Clotting disorder ([facility]), COPD (chronic obstructive pulmonary disease) ([facility]), Cushings syndrome ([facility]), Depression, Diabetes ([facility]), Diabetic neuropathy ([facility]), GERD (gastroesophageal reflux disease), Glaucoma, Goiter, Head ache, Heart attack ([facility]), Heart murmur, HIV (human immunodeficiency virus infection) ([facility]), Hyperlipidemia, Hypertension, IBD (inflammatory bowel disease), Kidney disease, Meningitis, Migraine, Muscle disorder, Osteoporosis, Parathyroid disorder ([facility]), Pituitary disease ([facility]), Pulmonary emphysema ([facility]), Seizure ([facility]), Sickle cell disease ([facility]), Stroke ([facility]), Substance abuse ([facility]), Thyroid disease, Tuberculosis, or Urinary tract infection.  MEDS:   Current Outpatient Medications:     amoxicillin (AMOXIL) 500 MG Cap, Take 1 Capsule by mouth 2 times a day for 10 days., Disp: 20 Capsule, Rfl: 0  ALLERGIES: No Known  "Allergies  SURGHX:   Past Surgical History:   Procedure Laterality Date    LUMPECTOMY       SOCHX:  reports that she has quit smoking. Her smoking use included cigarettes. She has never used smokeless tobacco. She reports that she does not currently use alcohol. She reports that she does not use drugs.  FH: Family history was reviewed, no pertinent findings to report   Objective:   /60 (BP Location: Right arm, Patient Position: Sitting, BP Cuff Size: Adult)   Pulse 90   Temp 36.7 °C (98.1 °F) (Temporal)   Resp 14   Ht 1.676 m (5' 6\")   Wt 66.9 kg (147 lb 7.8 oz)   SpO2 97%   BMI 23.81 kg/m²   Physical Exam  Vitals reviewed.   Constitutional:       General: She is not in acute distress.     Appearance: Normal appearance. She is well-developed. She is not toxic-appearing.   HENT:      Head: Normocephalic and atraumatic.      Right Ear: External ear normal.      Left Ear: External ear normal.      Nose: Nose normal. No congestion or rhinorrhea.      Mouth/Throat:      Lips: Pink.      Mouth: Mucous membranes are moist.      Pharynx: Oropharynx is clear. Uvula midline. Posterior oropharyngeal erythema present.      Tonsils: Tonsillar exudate present. 2+ on the right. 2+ on the left.   Cardiovascular:      Rate and Rhythm: Normal rate and regular rhythm.      Heart sounds: Normal heart sounds, S1 normal and S2 normal.   Pulmonary:      Effort: Pulmonary effort is normal. No respiratory distress.      Breath sounds: Normal breath sounds. No stridor. No decreased breath sounds, wheezing, rhonchi or rales.   Lymphadenopathy:      Cervical: Cervical adenopathy present.      Right cervical: Superficial cervical adenopathy present. No posterior cervical adenopathy.     Left cervical: Superficial cervical adenopathy present. No posterior cervical adenopathy.   Skin:     General: Skin is dry.   Neurological:      Comments: Alert and oriented.    Psychiatric:         Speech: Speech normal.         Behavior: Behavior " normal.         Results for orders placed or performed in visit on 04/02/23   POCT GROUP A STREP, PCR   Result Value Ref Range    POC Group A Strep, PCR Not Detected Not Detected, Invalid       Assessment/Plan:   1. Exudative tonsillitis  - POCT GROUP A STREP, PCR  - amoxicillin (AMOXIL) 500 MG Cap; Take 1 Capsule by mouth 2 times a day for 10 days.  Dispense: 20 Capsule; Refill: 0  - CULTURE THROAT; Future    Considerations include but not limited to group A strep, bacterial pharyngitis, viral pharyngitis, mono, influenza.  No evidence of peritonsillar abscess or deep space infection on exam today.      Testing for group A strep is negative.  Despite this I have mod to high clinical suspicion for bacterial etiology.  We will send sample for culture.  Patient started on antibiotics.    Recommend good hand hygiene and refraining from sharing food or drinks.  Change toothbrush 48 hours after beginning the antibiotics to avoid reinfection.  I also recommend sanitizing any reusable water bottles.      Salt water gargles, hot tea with honey, lozenges and ibuprofen as needed for pain.      If symptoms fail to improve within 48 hours, new symptoms develop, symptoms worsen see primary care provider or return to clinic for reevaluation.    If patient develops severe symptoms such as drooling/difficulty swallowing, difficulty opening their mouth, facial/neck redness or swelling, audible stridor or wheezing, difficulty moving their neck or other severe and concerning symptoms-I recommend that they go to the emergency room for further evaluation and management.

## 2023-04-02 NOTE — RESULT ENCOUNTER NOTE
Tino Caruso,    Your strep test was negative. I am going to send your sample to be cultured. You may start the antibiotic.    Kind regards,  MYRIAM

## 2023-04-04 LAB
BACTERIA SPEC RESP CULT: NORMAL
SIGNIFICANT IND 70042: NORMAL
SITE SITE: NORMAL
SOURCE SOURCE: NORMAL

## 2023-04-04 NOTE — RESULT ENCOUNTER NOTE
Tino Caruso,    So far your throat culture is negative. It will continue to incubate for a few more days. You may continue the antibiotic for now. However, if your final culture is negative, I recommend that you stop the antibiotic, as this means your symptoms are likely viral.    Kind regards,  MYRIAM